# Patient Record
Sex: MALE | Race: WHITE | NOT HISPANIC OR LATINO | URBAN - METROPOLITAN AREA
[De-identification: names, ages, dates, MRNs, and addresses within clinical notes are randomized per-mention and may not be internally consistent; named-entity substitution may affect disease eponyms.]

---

## 2017-07-19 ENCOUNTER — INPATIENT (INPATIENT)
Facility: HOSPITAL | Age: 72
LOS: 1 days | Discharge: HOME | DRG: 190 | End: 2017-07-21
Attending: GENERAL ACUTE CARE HOSPITAL | Admitting: GENERAL ACUTE CARE HOSPITAL
Payer: MEDICARE

## 2017-07-19 VITALS
DIASTOLIC BLOOD PRESSURE: 65 MMHG | SYSTOLIC BLOOD PRESSURE: 105 MMHG | HEART RATE: 100 BPM | RESPIRATION RATE: 24 BRPM | OXYGEN SATURATION: 95 % | TEMPERATURE: 37 F

## 2017-07-19 DIAGNOSIS — R06.00 DYSPNEA, UNSPECIFIED: ICD-10-CM

## 2017-07-19 DIAGNOSIS — J18.9 PNEUMONIA, UNSPECIFIED ORGANISM: ICD-10-CM

## 2017-07-19 DIAGNOSIS — R07.9 CHEST PAIN, UNSPECIFIED: ICD-10-CM

## 2017-07-19 DIAGNOSIS — Z90.2 ACQUIRED ABSENCE OF LUNG [PART OF]: Chronic | ICD-10-CM

## 2017-07-19 LAB
ALBUMIN SERPL ELPH-MCNC: 4 G/DL — SIGNIFICANT CHANGE UP (ref 3.3–5)
ALP SERPL-CCNC: 64 U/L — SIGNIFICANT CHANGE UP (ref 40–120)
ALT FLD-CCNC: 21 U/L RC — SIGNIFICANT CHANGE UP (ref 10–45)
ANION GAP SERPL CALC-SCNC: 15 MMOL/L — SIGNIFICANT CHANGE UP (ref 5–17)
APPEARANCE UR: CLEAR — SIGNIFICANT CHANGE UP
APTT BLD: 28 SEC — SIGNIFICANT CHANGE UP (ref 27.5–37.4)
AST SERPL-CCNC: 18 U/L — SIGNIFICANT CHANGE UP (ref 10–40)
BACTERIA # UR AUTO: ABNORMAL /HPF
BASE EXCESS BLDV CALC-SCNC: 1.4 MMOL/L — SIGNIFICANT CHANGE UP (ref -2–2)
BASOPHILS # BLD AUTO: 0 K/UL — SIGNIFICANT CHANGE UP (ref 0–0.2)
BILIRUB SERPL-MCNC: 1.2 MG/DL — SIGNIFICANT CHANGE UP (ref 0.2–1.2)
BILIRUB UR-MCNC: NEGATIVE — SIGNIFICANT CHANGE UP
BUN SERPL-MCNC: 27 MG/DL — HIGH (ref 7–23)
CA-I SERPL-SCNC: 1.2 MMOL/L — SIGNIFICANT CHANGE UP (ref 1.12–1.3)
CALCIUM SERPL-MCNC: 9.3 MG/DL — SIGNIFICANT CHANGE UP (ref 8.4–10.5)
CHLORIDE BLDV-SCNC: 96 MMOL/L — SIGNIFICANT CHANGE UP (ref 96–108)
CHLORIDE SERPL-SCNC: 95 MMOL/L — LOW (ref 96–108)
CK MB BLD-MCNC: 2.8 % — SIGNIFICANT CHANGE UP (ref 0–3.5)
CK MB CFR SERPL CALC: 2.7 NG/ML — SIGNIFICANT CHANGE UP (ref 0–6.7)
CK MB CFR SERPL CALC: 2.8 NG/ML — SIGNIFICANT CHANGE UP (ref 0–6.7)
CK SERPL-CCNC: 98 U/L — SIGNIFICANT CHANGE UP (ref 30–200)
CO2 BLDV-SCNC: 28 MMOL/L — SIGNIFICANT CHANGE UP (ref 22–30)
CO2 SERPL-SCNC: 24 MMOL/L — SIGNIFICANT CHANGE UP (ref 22–31)
COLOR SPEC: SIGNIFICANT CHANGE UP
CREAT SERPL-MCNC: 1.34 MG/DL — HIGH (ref 0.5–1.3)
DIFF PNL FLD: NEGATIVE — SIGNIFICANT CHANGE UP
EOSINOPHIL # BLD AUTO: 0.1 K/UL — SIGNIFICANT CHANGE UP (ref 0–0.5)
GAS PNL BLDV: 133 MMOL/L — LOW (ref 136–145)
GAS PNL BLDV: SIGNIFICANT CHANGE UP
GLUCOSE BLDV-MCNC: 133 MG/DL — HIGH (ref 70–99)
GLUCOSE SERPL-MCNC: 129 MG/DL — HIGH (ref 70–99)
GLUCOSE UR QL: NEGATIVE — SIGNIFICANT CHANGE UP
HCO3 BLDV-SCNC: 26 MMOL/L — SIGNIFICANT CHANGE UP (ref 21–29)
HCT VFR BLD CALC: 40.8 % — SIGNIFICANT CHANGE UP (ref 39–50)
HCT VFR BLDA CALC: 44 % — SIGNIFICANT CHANGE UP (ref 39–50)
HGB BLD CALC-MCNC: 14.3 G/DL — SIGNIFICANT CHANGE UP (ref 13–17)
HGB BLD-MCNC: 13.5 G/DL — SIGNIFICANT CHANGE UP (ref 13–17)
INR BLD: 1.23 RATIO — HIGH (ref 0.88–1.16)
KETONES UR-MCNC: NEGATIVE — SIGNIFICANT CHANGE UP
LACTATE BLDV-MCNC: 1.6 MMOL/L — SIGNIFICANT CHANGE UP (ref 0.7–2)
LEUKOCYTE ESTERASE UR-ACNC: NEGATIVE — SIGNIFICANT CHANGE UP
LYMPHOCYTES # BLD AUTO: 1.3 K/UL — SIGNIFICANT CHANGE UP (ref 1–3.3)
LYMPHOCYTES # BLD AUTO: 3 % — LOW (ref 13–44)
MCHC RBC-ENTMCNC: 29.7 PG — SIGNIFICANT CHANGE UP (ref 27–34)
MCHC RBC-ENTMCNC: 33 GM/DL — SIGNIFICANT CHANGE UP (ref 32–36)
MCV RBC AUTO: 90.1 FL — SIGNIFICANT CHANGE UP (ref 80–100)
METAMYELOCYTES # FLD: 1 % — HIGH (ref 0–0)
MONOCYTES # BLD AUTO: 1.6 K/UL — HIGH (ref 0–0.9)
MONOCYTES NFR BLD AUTO: 5 % — SIGNIFICANT CHANGE UP (ref 2–14)
NEUTROPHILS # BLD AUTO: 24.2 K/UL — HIGH (ref 1.8–7.4)
NEUTROPHILS NFR BLD AUTO: 82 % — HIGH (ref 43–77)
NEUTS BAND # BLD: 9 % — HIGH (ref 0–8)
NITRITE UR-MCNC: NEGATIVE — SIGNIFICANT CHANGE UP
NT-PROBNP SERPL-SCNC: 768 PG/ML — HIGH (ref 0–300)
OTHER CELLS CSF MANUAL: 8 ML/DL — LOW (ref 18–22)
PCO2 BLDV: 45 MMHG — SIGNIFICANT CHANGE UP (ref 35–50)
PH BLDV: 7.38 — SIGNIFICANT CHANGE UP (ref 7.35–7.45)
PH UR: 6 — SIGNIFICANT CHANGE UP (ref 5–8)
PLAT MORPH BLD: NORMAL — SIGNIFICANT CHANGE UP
PLATELET # BLD AUTO: 291 K/UL — SIGNIFICANT CHANGE UP (ref 150–400)
PO2 BLDV: 28 MMHG — SIGNIFICANT CHANGE UP (ref 25–45)
POTASSIUM BLDV-SCNC: 4.1 MMOL/L — SIGNIFICANT CHANGE UP (ref 3.5–5)
POTASSIUM SERPL-MCNC: 4.4 MMOL/L — SIGNIFICANT CHANGE UP (ref 3.5–5.3)
POTASSIUM SERPL-SCNC: 4.4 MMOL/L — SIGNIFICANT CHANGE UP (ref 3.5–5.3)
PROT SERPL-MCNC: 7.3 G/DL — SIGNIFICANT CHANGE UP (ref 6–8.3)
PROT UR-MCNC: NEGATIVE — SIGNIFICANT CHANGE UP
PROTHROM AB SERPL-ACNC: 13.3 SEC — HIGH (ref 9.8–12.7)
RBC # BLD: 4.54 M/UL — SIGNIFICANT CHANGE UP (ref 4.2–5.8)
RBC # FLD: 13.5 % — SIGNIFICANT CHANGE UP (ref 10.3–14.5)
RBC BLD AUTO: SIGNIFICANT CHANGE UP
RBC CASTS # UR COMP ASSIST: SIGNIFICANT CHANGE UP /HPF (ref 0–2)
SAO2 % BLDV: 39 % — LOW (ref 67–88)
SODIUM SERPL-SCNC: 134 MMOL/L — LOW (ref 135–145)
SP GR SPEC: 1.01 — LOW (ref 1.01–1.02)
TROPONIN T SERPL-MCNC: <0.01 NG/ML — SIGNIFICANT CHANGE UP (ref 0–0.06)
TROPONIN T SERPL-MCNC: <0.01 NG/ML — SIGNIFICANT CHANGE UP (ref 0–0.06)
UROBILINOGEN FLD QL: NEGATIVE — SIGNIFICANT CHANGE UP
WBC # BLD: 27.2 K/UL — HIGH (ref 3.8–10.5)
WBC # FLD AUTO: 27.2 K/UL — HIGH (ref 3.8–10.5)
WBC UR QL: SIGNIFICANT CHANGE UP /HPF (ref 0–5)

## 2017-07-19 PROCEDURE — 93010 ELECTROCARDIOGRAM REPORT: CPT

## 2017-07-19 PROCEDURE — 71275 CT ANGIOGRAPHY CHEST: CPT | Mod: 26

## 2017-07-19 PROCEDURE — 71020: CPT | Mod: 26

## 2017-07-19 PROCEDURE — 99284 EMERGENCY DEPT VISIT MOD MDM: CPT | Mod: 25

## 2017-07-19 RX ORDER — LORATADINE 10 MG/1
1 TABLET ORAL
Qty: 0 | Refills: 0 | COMMUNITY

## 2017-07-19 RX ORDER — BUDESONIDE AND FORMOTEROL FUMARATE DIHYDRATE 160; 4.5 UG/1; UG/1
2 AEROSOL RESPIRATORY (INHALATION)
Qty: 0 | Refills: 0 | Status: DISCONTINUED | OUTPATIENT
Start: 2017-07-19 | End: 2017-07-20

## 2017-07-19 RX ORDER — TADALAFIL 10 MG/1
1 TABLET, FILM COATED ORAL
Qty: 0 | Refills: 0 | COMMUNITY

## 2017-07-19 RX ORDER — IPRATROPIUM/ALBUTEROL SULFATE 18-103MCG
3 AEROSOL WITH ADAPTER (GRAM) INHALATION
Qty: 0 | Refills: 0 | Status: COMPLETED | OUTPATIENT
Start: 2017-07-19 | End: 2017-07-19

## 2017-07-19 RX ORDER — AMLODIPINE BESYLATE 2.5 MG/1
1 TABLET ORAL
Qty: 0 | Refills: 0 | COMMUNITY

## 2017-07-19 RX ORDER — ENOXAPARIN SODIUM 100 MG/ML
40 INJECTION SUBCUTANEOUS ONCE
Qty: 0 | Refills: 0 | Status: COMPLETED | OUTPATIENT
Start: 2017-07-19 | End: 2017-07-19

## 2017-07-19 RX ORDER — TIOTROPIUM BROMIDE 18 UG/1
1 CAPSULE ORAL; RESPIRATORY (INHALATION)
Qty: 0 | Refills: 0 | COMMUNITY

## 2017-07-19 RX ORDER — FAMOTIDINE 10 MG/ML
20 INJECTION INTRAVENOUS DAILY
Qty: 0 | Refills: 0 | Status: DISCONTINUED | OUTPATIENT
Start: 2017-07-19 | End: 2017-07-21

## 2017-07-19 RX ORDER — ENOXAPARIN SODIUM 100 MG/ML
40 INJECTION SUBCUTANEOUS DAILY
Qty: 0 | Refills: 0 | Status: DISCONTINUED | OUTPATIENT
Start: 2017-07-19 | End: 2017-07-19

## 2017-07-19 RX ORDER — SODIUM CHLORIDE 9 MG/ML
1000 INJECTION INTRAMUSCULAR; INTRAVENOUS; SUBCUTANEOUS ONCE
Qty: 0 | Refills: 0 | Status: COMPLETED | OUTPATIENT
Start: 2017-07-19 | End: 2017-07-19

## 2017-07-19 RX ORDER — FLUTICASONE PROPIONATE AND SALMETEROL 50; 250 UG/1; UG/1
1 POWDER ORAL; RESPIRATORY (INHALATION)
Qty: 0 | Refills: 0 | COMMUNITY

## 2017-07-19 RX ORDER — ALBUTEROL 90 UG/1
3 AEROSOL, METERED ORAL
Qty: 0 | Refills: 0 | COMMUNITY

## 2017-07-19 RX ORDER — FLUTICASONE PROPIONATE 50 MCG
1 SPRAY, SUSPENSION NASAL
Qty: 0 | Refills: 0 | Status: DISCONTINUED | OUTPATIENT
Start: 2017-07-19 | End: 2017-07-21

## 2017-07-19 RX ORDER — NEBIVOLOL HYDROCHLORIDE 5 MG/1
5 TABLET ORAL DAILY
Qty: 0 | Refills: 0 | Status: DISCONTINUED | OUTPATIENT
Start: 2017-07-19 | End: 2017-07-21

## 2017-07-19 RX ORDER — LORATADINE 10 MG/1
10 TABLET ORAL DAILY
Qty: 0 | Refills: 0 | Status: DISCONTINUED | OUTPATIENT
Start: 2017-07-19 | End: 2017-07-21

## 2017-07-19 RX ORDER — TIOTROPIUM BROMIDE 18 UG/1
1 CAPSULE ORAL; RESPIRATORY (INHALATION) DAILY
Qty: 0 | Refills: 0 | Status: DISCONTINUED | OUTPATIENT
Start: 2017-07-19 | End: 2017-07-20

## 2017-07-19 RX ORDER — AMLODIPINE BESYLATE 2.5 MG/1
5 TABLET ORAL DAILY
Qty: 0 | Refills: 0 | Status: DISCONTINUED | OUTPATIENT
Start: 2017-07-19 | End: 2017-07-21

## 2017-07-19 RX ADMIN — SODIUM CHLORIDE 1000 MILLILITER(S): 9 INJECTION INTRAMUSCULAR; INTRAVENOUS; SUBCUTANEOUS at 17:41

## 2017-07-19 RX ADMIN — NEBIVOLOL HYDROCHLORIDE 5 MILLIGRAM(S): 5 TABLET ORAL at 23:13

## 2017-07-19 RX ADMIN — ENOXAPARIN SODIUM 40 MILLIGRAM(S): 100 INJECTION SUBCUTANEOUS at 18:23

## 2017-07-19 RX ADMIN — Medication 3 MILLILITER(S): at 12:47

## 2017-07-19 RX ADMIN — Medication 1 SPRAY(S): at 20:21

## 2017-07-19 RX ADMIN — Medication 1200 MILLIGRAM(S): at 20:22

## 2017-07-19 RX ADMIN — Medication 3 MILLILITER(S): at 12:48

## 2017-07-19 RX ADMIN — Medication 3 MILLILITER(S): at 12:50

## 2017-07-19 RX ADMIN — Medication 125 MILLIGRAM(S): at 12:48

## 2017-07-19 NOTE — CONSULT NOTE ADULT - SUBJECTIVE AND OBJECTIVE BOX
[facility] - [facility]  CONSULT NOTE    HPI: 72 year old gentleman, former smoker, retired , followed by Dr.David Burton for COPD/emphysema and squamous cell lung cancer s/p lingular segmentectomy in  complicated by severe total body subcutaneous emphysema. He was treated with levaquin in May after a CT scan at [facility] was abnormal although the patient denies having acute symptoms. He was seen last week in our office for several weeks of worsening shortness of breath especially when walking outside in the hot weather and not relieved by his rescue bronchodilator. He had more prominent cough productive of white sputum as well as chest tightness. The patient was treated with augmentin, mucinex, albuterol nebs, prednisone taper in addition to his usual advair, spiriva and pro-air HFA as needed. After initial improvement, the patient has again developed severe shortness of breath with a mild cough. He awoke last night with a severe sense of anxiety and doom associated with chest tightness and palpitations. No fevers, chills or sweats. No chest congestion or wheeze.     PMHX:  Lung cancer (squamous cell carcinoma)  Emphysema lung  COPD (chronic obstructive pulmonary disease)  Herpetic ocular infection  GERD  Nephrolithiasis  Pneumonia  HTN  Prostate cancer s/p RT   Seasonal allergic rhinitis      PSHX:  H/O lingular segmentectomy  cholecystecomy  colonoscopy      FAMILY HISTORY: cancer in mother/father/sister/brother    SOCIAL HISTORY: former smoker 1ppd x 54 years d/c 2010 - retired  -     Pulmonary Medications:       Antimicrobials:  levoFLOXacin IVPB 750 milliGRAM(s) IV Intermittent once      Cardiology:      Other:      Allergies    No Known Allergies      HOME MEDICATIONS: see  H & P    REVIEW OF SYSTEMS:  Constitutional: As per HPI  HEENT: Within normal limits  CV: As per HPI  Resp: As per HPI  GI: Within normal limits   : Within normal limits  Musculoskeletal: Within normal limits  Skin: Within normal limits  Neurological: Within normal limits  Psychiatric: As per HPI  Endocrine: Within normal limits  Hematologic/Lymphatic: Within normal limits  Allergic/Immunologic: Within normal limits    [ ] All other systems negative                                                                                                                                                                              [ ]Unable to obtain    OBJECTIVE:      I&O's Detail      PHYSICAL EXAM:  ICU Vital Signs Last 24 Hrs  T(C): 2.6 (2017 12:10), Max: 2.6 (2017 12:10)  T(F): 36.7 (2017 12:10), Max: 36.7 (2017 12:10)  HR: 95 (2017 12:14) (95 - 100)  BP: 101/57 (2017 12:14) (101/57 - 105/65)  BP(mean): --  ABP: --  ABP(mean): --  RR: 20 (2017 12:14) (20 - 24)  SpO2: 95% (2017 12:14) (95% - 95%)    General: Awake. Alert. Cooperative. No distress. Appears stated age 	  HEENT:   Atraumatic. Normocephalic. Anicteric. Normal oral mucosa, PERRL, EOMI  Neck: Supple. Trachea midline. Thyroid without enlargement/tenderness/nodules. No carotid bruit. No JVD	  Cardiovascular: Regular rate and rhythm. S1 S2 normal. No murmurs, rubs or gallops  Respiratory: Respirations unlabored. Decreased breath sounds throughout. Minimal rhonchi.  Abdomen: Soft. Non-tender. Non-distended. No organomegaly. No masses. Normal bowel sounds	  Extremities: Warm to touch. No clubbing or cyanosis. No pedal edema.  Pulses: 2+ peripheral pulses all extremities	  Skin: Normal skin color. No rashes or lesions. No ecchymoses, No cyanosis. Warm to touch  Lymph Nodes: Cervical, supraclavicular and axillary nodes normal  Neurological: Motor and sensory examination equal and normal. A and O x 3  Psychiatry: Appropriate mood and affect.      LABS:                          13.5   27.2  )-----------( 291      ( 2017 12:54 )             40.8     07-19    134<L>  |  95<L>  |  27<H>  ----------------------------<  129<H>  4.4   |  24  |  1.34<H>    Ca      9.3            TPro  7.3  /  Alb  4.0  /  TBili  1.2  /  DBili  x   /  AST  18  /  ALT  21  /  AlkPhos  64  07-19    PT/INR - ( 2017 12:54 )   PT: 13.3 sec;   INR: 1.23 ratio         PTT - ( 2017 12:54 )  PTT:28.0 sec    Venous Blood Gas:   @ 12:54  7.38/45/28/26/39  VBG Lactate: 1.6        Serum Pro-Brain Natriuretic Peptide: 768 pg/mL ( @ 12:54)      CARDIAC MARKERS ( 2017 12:54 )  x     / <0.01 ng/mL / 98 U/L / x     / 2.7 ng/mL          MICROBIOLOGY:   Urinalysis Basic - ( 2017 15:02 )    Color: x / Appearance: Clear / S.007 / pH: x  Gluc: x / Ketone: Negative  / Bili: Negative / Urobili: Negative   Blood: x / Protein: Negative / Nitrite: Negative   Leuk Esterase: Negative / RBC: 0-2 /HPF / WBC 0-2 /HPF   Sq Epi: x / Non Sq Epi: x / Bacteria: Few /HPF        RADIOLOGY:  [x ] Chest radiographs reviewed and interpreted by me    < from: Xray Chest 2 Views PA/Lat (17 @ 13:50) >    EXAM:  CHEST PA & LAT                            PROCEDURE DATE:  2017        INTERPRETATION:  CLINICAL INFORMATION: Chest pain and cough    EXAM: AP view of the chest  2017    COMPARISON: No radiographs are available for comparison at this time.    FINDINGS:    The heart is normal in size  There are no focal lung consolidations.  There is no pleural effusion.  There is no pneumothorax.    IMPRESSION:    No focal lung consolidation      SARITA MATHEWS M.D., RADIOLOGY RESIDENT  This document has been electronically signed.  HADLEY CARNEY M.D., ATTENDING RADIOLOGIST  This document has been electronically signed. 2017  2:30PM      < end of copied text >  ---------------------------------------------------------------------------------------------------------

## 2017-07-19 NOTE — CONSULT NOTE ADULT - ASSESSMENT
sob/chest tightness  likely due to copd exacerbation cant rule out PNA  fu with ct and pulm  agree with anbx, nebs, steroids  JOSE with 2 sets of ce.  check echo  ischemic work up once stable , can be done as outpt  HTN  stable  cont current meds  copd  plan as above

## 2017-07-19 NOTE — ED PROVIDER NOTE - MEDICAL DECISION MAKING DETAILS
attending Almaz: 72yM with COPD, prior lung CA s/p resection, presents with worsening dyspnea x several days but with significantly worsening symptoms since last night after episode of sudden onset SOB and palpitations. No improvement on Amoxicillin and Prednisone as outpatient. He denies cough, or fever. +chills. On examination, O2 sat 92-92%, mildly tachypneic, poor air movement diffusely on lung exam. Most likely COPD exacerbation but with episode of sudden onset SOB and palpitations also concerned for PE. Will administer duonebs, steroids, abx, obtain labwork, ekg, cardiac monitoring cxr, CTA r/o PE and admission.

## 2017-07-19 NOTE — H&P ADULT - PROBLEM SELECTOR PLAN 1
sec to copd given risk factors  as well as giiven elevated WBC ( likely sec to sterodis ) , cough and chills  CTA ordered to r/o PE and PNA .  CT done : f/u given risk factors  as well as giiven elevated WBC ( likely sec to sterodis ) , cough and chills  CTA ordered to r/o PE and PNA .   cont prednsione at increased dose for copd exacerbaion . levaquin   CT done : f/u

## 2017-07-19 NOTE — CONSULT NOTE ADULT - SUBJECTIVE AND OBJECTIVE BOX
CHIEF COMPLAINT: sob/cp    HISTORY OF PRESENT ILLNESS:    this is a pleasant 72 year old male w COPD, Lung Cancer s/p resection in 2010 presents w dyspnea, hypotension, chest tightness  pos cough, fever, chills no dizziness or syncope chest tightness not exertional 	      PAST MEDICAL & SURGICAL HISTORY:  Lung cancer  Emphysema lung  COPD (chronic obstructive pulmonary disease)  H/O pneumonectomy          MEDICATIONS:  nebivolol 5 milliGRAM(s) Oral daily  amLODIPine   Tablet 5 milliGRAM(s) Oral daily    levoFLOXacin IVPB 500 milliGRAM(s) IV Intermittent every 24 hours    buDESOnide 160 MICROgram(s)/formoterol 4.5 MICROgram(s) Inhaler 2 Puff(s) Inhalation two times a day  tiotropium 18 MICROgram(s) Capsule 1 Capsule(s) Inhalation daily  loratadine 10 milliGRAM(s) Oral daily  guaiFENesin ER 1200 milliGRAM(s) Oral every 12 hours      famotidine    Tablet 20 milliGRAM(s) Oral daily    predniSONE   Tablet 50 milliGRAM(s) Oral daily    fluticasone propionate 50 MICROgram(s)/spray Nasal Spray 1 Spray(s) Both Nostrils two times a day      FAMILY HISTORY:      Non-contributory    SOCIAL HISTORY:    No tobacco, drugs or etoh    Allergies    No Known Allergies    Intolerances    	    REVIEW OF SYSTEMS:  as above  The rest of the 14 points ROS reviewed and except above they are unremarkable.        PHYSICAL EXAM:  T(C): 36.7 (07-19-17 @ 16:08), Max: 36.7 (07-19-17 @ 16:08)  HR: 86 (07-19-17 @ 16:08) (86 - 100)  BP: 115/72 (07-19-17 @ 16:08) (101/57 - 115/72)  RR: 16 (07-19-17 @ 16:08) (16 - 24)  SpO2: 93% (07-19-17 @ 16:08) (93% - 95%)  Wt(kg): --  I&O's Summary      Appearance: Normal	  HEENT:   Normal oral mucosa, PERRL, EOMI	  Cardiovascular: Normal S1 S2,    Murmur:   Neck: JVP normal  Respiratory: few crackles   Gastrointestinal:  Soft, Non-tender, + BS	  Skin: normal   Neuro: No gross deficits.   Psychiatry:  Mood & affect appropriate  Ext: No edema    TELEMETRY: 	  pvcs  ECG:  	sinus , rbbb  RADIOLOGY:  OTHER: 	  	  LABS:	 	    CARDIAC MARKERS:  Troponin T, Serum: <0.01 ng/mL (07-19 @ 12:54)                                  13.5   27.2  )-----------( 291      ( 19 Jul 2017 12:54 )             40.8     07-19    134<L>  |  95<L>  |  27<H>  ----------------------------<  129<H>  4.4   |  24  |  1.34<H>    Ca    9.3      19 Jul 2017 12:54    TPro  7.3  /  Alb  4.0  /  TBili  1.2  /  DBili  x   /  AST  18  /  ALT  21  /  AlkPhos  64  07-19    proBNP: Serum Pro-Brain Natriuretic Peptide: 768 pg/mL (07-19 @ 12:54)    Lipid Profile:   HgA1c:   TSH:

## 2017-07-19 NOTE — H&P ADULT - ASSESSMENT
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 now a /w CP/SOB . 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 on abx and prednsione for one week old COPD exacerbation / cough now a /w CP/SOB on day

## 2017-07-19 NOTE — ED ADULT NURSE NOTE - OBJECTIVE STATEMENT
Pt with hs of copd emphysema and lung ca with pneumonectomy in 2010 presents c/o chest tightness and sob. Pt reports being sent by md after taking Amoxicillin prednisone and albuterol neb for a persistent cough and sob for one week. Pt reports no relief from medications and was also sent for low bp and irregular heartrate. Pt A&Ox3 denies chest pain, +sob no nvd, no fever chills headache or dizziness +cough no le swelling or jvd.

## 2017-07-19 NOTE — CONSULT NOTE ADULT - ASSESSMENT
ASSESSMENT    h/o COPD/emphysema with minimal bronchospasm  h/o lung cancer s/p lingular segmentectomy - CT scan at [facility] in May with an abnormality treated with levaquin - follow-u CT had been scheduled for August  dyspnea which seems out of proportion to bronchospasm - would r/o pulmonary embolism - elevated WBC may be due to steroids but need to r/o occult pneumonia    PLAN/RECOMMENDATIONS    Stable oxygenation on room air  levaquin   prednisone 50 mg daily  symbicort/spiriva  mucinex  claritin/flonase  pepcid  DVT prophylaxis  CTA chest    Thank you for the courtesy of this referral    Crow Varghese MD, [facility] - [phone]  Pulmonary Medicine

## 2017-07-19 NOTE — H&P ADULT - HISTORY OF PRESENT ILLNESS
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 now a /w CP/SOB . pt was seen and examined on 7/19   case discusssed with DR. Varghese   plan discussed with pt and wife at bedside   72 year old gentleman, former smoker, retired , followed by Dr. Crow Burton for COPD/emphysema and squamous cell lung cancer s/p lingular segmentectomy in 2010 a /w CP/SOB .  Pt was seen as o/p one week ago for cough and sob , was given prednsione , nebs and augmentin.. he had some improvement overall however overnight pt was awaken by an episode of sob and chest discomfort .   CP  6/10 no radiaition  , no palplitation   , brief and non exertional and " improved   cough with no fever but had chills at home   no N/V/D

## 2017-07-20 ENCOUNTER — TRANSCRIPTION ENCOUNTER (OUTPATIENT)
Age: 72
End: 2017-07-20

## 2017-07-20 DIAGNOSIS — I10 ESSENTIAL (PRIMARY) HYPERTENSION: ICD-10-CM

## 2017-07-20 DIAGNOSIS — C61 MALIGNANT NEOPLASM OF PROSTATE: ICD-10-CM

## 2017-07-20 DIAGNOSIS — C34.90 MALIGNANT NEOPLASM OF UNSPECIFIED PART OF UNSPECIFIED BRONCHUS OR LUNG: ICD-10-CM

## 2017-07-20 LAB
ALBUMIN SERPL ELPH-MCNC: 3.1 G/DL — LOW (ref 3.3–5)
ALP SERPL-CCNC: 62 U/L — SIGNIFICANT CHANGE UP (ref 40–120)
ALT FLD-CCNC: 17 U/L — SIGNIFICANT CHANGE UP (ref 10–45)
ANION GAP SERPL CALC-SCNC: 14 MMOL/L — SIGNIFICANT CHANGE UP (ref 5–17)
AST SERPL-CCNC: 15 U/L — SIGNIFICANT CHANGE UP (ref 10–40)
BASOPHILS # BLD AUTO: 0 K/UL — SIGNIFICANT CHANGE UP (ref 0–0.2)
BASOPHILS NFR BLD AUTO: 0 % — SIGNIFICANT CHANGE UP (ref 0–2)
BILIRUB SERPL-MCNC: 0.7 MG/DL — SIGNIFICANT CHANGE UP (ref 0.2–1.2)
BUN SERPL-MCNC: 27 MG/DL — HIGH (ref 7–23)
CALCIUM SERPL-MCNC: 8.9 MG/DL — SIGNIFICANT CHANGE UP (ref 8.4–10.5)
CHLORIDE SERPL-SCNC: 100 MMOL/L — SIGNIFICANT CHANGE UP (ref 96–108)
CK MB CFR SERPL CALC: 2.7 NG/ML — SIGNIFICANT CHANGE UP (ref 0–6.7)
CO2 SERPL-SCNC: 23 MMOL/L — SIGNIFICANT CHANGE UP (ref 22–31)
CREAT SERPL-MCNC: 1.03 MG/DL — SIGNIFICANT CHANGE UP (ref 0.5–1.3)
CULTURE RESULTS: NO GROWTH — SIGNIFICANT CHANGE UP
EOSINOPHIL # BLD AUTO: 0 K/UL — SIGNIFICANT CHANGE UP (ref 0–0.5)
EOSINOPHIL NFR BLD AUTO: 0 % — SIGNIFICANT CHANGE UP (ref 0–6)
GLUCOSE SERPL-MCNC: 134 MG/DL — HIGH (ref 70–99)
HCT VFR BLD CALC: 35.8 % — LOW (ref 39–50)
HGB BLD-MCNC: 12.2 G/DL — LOW (ref 13–17)
IMM GRANULOCYTES NFR BLD AUTO: 0.4 % — SIGNIFICANT CHANGE UP (ref 0–1.5)
LYMPHOCYTES # BLD AUTO: 0.95 K/UL — LOW (ref 1–3.3)
LYMPHOCYTES # BLD AUTO: 4.6 % — LOW (ref 13–44)
MCHC RBC-ENTMCNC: 29.5 PG — SIGNIFICANT CHANGE UP (ref 27–34)
MCHC RBC-ENTMCNC: 34.1 GM/DL — SIGNIFICANT CHANGE UP (ref 32–36)
MCV RBC AUTO: 86.5 FL — SIGNIFICANT CHANGE UP (ref 80–100)
MONOCYTES # BLD AUTO: 0.48 K/UL — SIGNIFICANT CHANGE UP (ref 0–0.9)
MONOCYTES NFR BLD AUTO: 2.3 % — SIGNIFICANT CHANGE UP (ref 2–14)
NEUTROPHILS # BLD AUTO: 18.92 K/UL — HIGH (ref 1.8–7.4)
NEUTROPHILS NFR BLD AUTO: 92.7 % — HIGH (ref 43–77)
PHOSPHATE SERPL-MCNC: 3 MG/DL — SIGNIFICANT CHANGE UP (ref 2.5–4.5)
PLATELET # BLD AUTO: 234 K/UL — SIGNIFICANT CHANGE UP (ref 150–400)
POTASSIUM SERPL-MCNC: 5 MMOL/L — SIGNIFICANT CHANGE UP (ref 3.5–5.3)
POTASSIUM SERPL-SCNC: 5 MMOL/L — SIGNIFICANT CHANGE UP (ref 3.5–5.3)
PROT SERPL-MCNC: 6.6 G/DL — SIGNIFICANT CHANGE UP (ref 6–8.3)
RBC # BLD: 4.14 M/UL — LOW (ref 4.2–5.8)
RBC # FLD: 14.8 % — HIGH (ref 10.3–14.5)
SODIUM SERPL-SCNC: 137 MMOL/L — SIGNIFICANT CHANGE UP (ref 135–145)
SPECIMEN SOURCE: SIGNIFICANT CHANGE UP
TROPONIN T SERPL-MCNC: <0.01 NG/ML — SIGNIFICANT CHANGE UP (ref 0–0.06)
WBC # BLD: 20.44 K/UL — HIGH (ref 3.8–10.5)
WBC # FLD AUTO: 20.44 K/UL — HIGH (ref 3.8–10.5)

## 2017-07-20 PROCEDURE — 93306 TTE W/DOPPLER COMPLETE: CPT | Mod: 26

## 2017-07-20 RX ORDER — BUDESONIDE, MICRONIZED 100 %
0.5 POWDER (GRAM) MISCELLANEOUS EVERY 12 HOURS
Qty: 0 | Refills: 0 | Status: DISCONTINUED | OUTPATIENT
Start: 2017-07-20 | End: 2017-07-21

## 2017-07-20 RX ORDER — IPRATROPIUM/ALBUTEROL SULFATE 18-103MCG
3 AEROSOL WITH ADAPTER (GRAM) INHALATION
Qty: 0 | Refills: 0 | Status: DISCONTINUED | OUTPATIENT
Start: 2017-07-20 | End: 2017-07-21

## 2017-07-20 RX ORDER — TAMSULOSIN HYDROCHLORIDE 0.4 MG/1
0.4 CAPSULE ORAL AT BEDTIME
Qty: 0 | Refills: 0 | Status: DISCONTINUED | OUTPATIENT
Start: 2017-07-20 | End: 2017-07-21

## 2017-07-20 RX ADMIN — Medication 1 SPRAY(S): at 06:32

## 2017-07-20 RX ADMIN — Medication 1200 MILLIGRAM(S): at 17:24

## 2017-07-20 RX ADMIN — Medication 3 MILLILITER(S): at 14:49

## 2017-07-20 RX ADMIN — Medication 3 MILLILITER(S): at 11:12

## 2017-07-20 RX ADMIN — LORATADINE 10 MILLIGRAM(S): 10 TABLET ORAL at 11:12

## 2017-07-20 RX ADMIN — FAMOTIDINE 20 MILLIGRAM(S): 10 INJECTION INTRAVENOUS at 11:12

## 2017-07-20 RX ADMIN — Medication 1 SPRAY(S): at 17:24

## 2017-07-20 RX ADMIN — Medication 3 MILLILITER(S): at 21:23

## 2017-07-20 RX ADMIN — NEBIVOLOL HYDROCHLORIDE 5 MILLIGRAM(S): 5 TABLET ORAL at 21:23

## 2017-07-20 RX ADMIN — Medication 0.5 MILLIGRAM(S): at 17:24

## 2017-07-20 RX ADMIN — TAMSULOSIN HYDROCHLORIDE 0.4 MILLIGRAM(S): 0.4 CAPSULE ORAL at 00:24

## 2017-07-20 RX ADMIN — TAMSULOSIN HYDROCHLORIDE 0.4 MILLIGRAM(S): 0.4 CAPSULE ORAL at 21:23

## 2017-07-20 RX ADMIN — Medication 3 MILLILITER(S): at 17:24

## 2017-07-20 RX ADMIN — Medication 1200 MILLIGRAM(S): at 06:33

## 2017-07-20 RX ADMIN — Medication 50 MILLIGRAM(S): at 06:34

## 2017-07-20 RX ADMIN — AMLODIPINE BESYLATE 5 MILLIGRAM(S): 2.5 TABLET ORAL at 06:36

## 2017-07-20 NOTE — PROGRESS NOTE ADULT - PROBLEM SELECTOR PLAN 1
given risk factors  as well as given elevated WBC ( likely sec to steroids ) , CTA done r/o PNA   r/o PE :both not seen on CT     cont prednsione at increased dose for copd exacerbaion . levaquin   nebs and check O2 sat on RA at rest and on exertion

## 2017-07-20 NOTE — DISCHARGE NOTE ADULT - CARE PROVIDER_API CALL
Crow Burton), Internal Medicine; Pulmonary Disease  [address] [address]  Phone: [phone]  Fax: [phone]    Wander Vasquez), Cardiovascular Disease  [address] [place] [id number]  Phone: [phone]  Fax: [phone]

## 2017-07-20 NOTE — DISCHARGE NOTE ADULT - PLAN OF CARE
Pneumonia is a lung infection that can cause a fever, cough, and trouble breathing.  Continue all antibiotics as ordered until complete.  Nutrition is important, eat small frequent meals.  Get lots of rest and drink fluids.  Call your health care provider upon arrival home from hospital and make a follow up appointment for one week.  If your cough worsens, you develop fever greater than 101', you have shaking chills, a fast heartbeat, trouble breathing and/or feel your are breathing much faster than usual, call your healthcare provider.  Make sure you wash your hands frequently. Call your Health Care provider upon arrival home to make a follow up appointment within one week.  Take all inhalers as prescribed by your Health Care Provider.  Take steroids as prescribed by your Health Care Provider.  If your cough increases infrequency and severity and/or you have shortness of breath or increased shortness of breath call your Health Care Provider.  If you develop fever, chills, night sweats, malaise, and/or change in mental status call your Health care Provider.  Nutrition is very important.  Eat small frequent meals.  Increase your activity as tolerated.  Do not stay in bed all day Take medications for your blood pressure as recommended.  Eat a heart healthy diet that is low in saturated fats and salt, and includes whole grains, fruits, vegetables and lean protein   Exercise regularly (consult with your physician or cardiologist first); maintain a heart healthy weight.   If you smoke - quit (A resource to help you stop smoking is the [facility] – phone number 888-031-8763.). Continue to follow with your primary physician or cardiologist.   Seek medical help for dizziness, Lightheadedness, Blurry vision, Headache, Chest pain, Shortness of breath  Follow up with your medical doctor to establish long term blood pressure treatment goals. Follow with your oncologist as outpatient. Continue with antibiotic treatment Follow up with your medical doctor to establish long term blood pressure treatment goals. Symptom management

## 2017-07-20 NOTE — DISCHARGE NOTE ADULT - CARE PLAN
Principal Discharge DX:	Pneumonia  Instructions for follow-up, activity and diet:	Pneumonia is a lung infection that can cause a fever, cough, and trouble breathing.  Continue all antibiotics as ordered until complete.  Nutrition is important, eat small frequent meals.  Get lots of rest and drink fluids.  Call your health care provider upon arrival home from hospital and make a follow up appointment for one week.  If your cough worsens, you develop fever greater than 101', you have shaking chills, a fast heartbeat, trouble breathing and/or feel your are breathing much faster than usual, call your healthcare provider.  Make sure you wash your hands frequently.  Secondary Diagnosis:	COPD (chronic obstructive pulmonary disease)  Instructions for follow-up, activity and diet:	Call your Health Care provider upon arrival home to make a follow up appointment within one week.  Take all inhalers as prescribed by your Health Care Provider.  Take steroids as prescribed by your Health Care Provider.  If your cough increases infrequency and severity and/or you have shortness of breath or increased shortness of breath call your Health Care Provider.  If you develop fever, chills, night sweats, malaise, and/or change in mental status call your Health care Provider.  Nutrition is very important.  Eat small frequent meals.  Increase your activity as tolerated.  Do not stay in bed all day  Secondary Diagnosis:	Hypertension  Instructions for follow-up, activity and diet:	Take medications for your blood pressure as recommended.  Eat a heart healthy diet that is low in saturated fats and salt, and includes whole grains, fruits, vegetables and lean protein   Exercise regularly (consult with your physician or cardiologist first); maintain a heart healthy weight.   If you smoke - quit (A resource to help you stop smoking is the [facility] – phone number [phone].). Continue to follow with your primary physician or cardiologist.   Seek medical help for dizziness, Lightheadedness, Blurry vision, Headache, Chest pain, Shortness of breath  Follow up with your medical doctor to establish long term blood pressure treatment goals.  Secondary Diagnosis:	Lung cancer  Instructions for follow-up, activity and diet:	Follow with your oncologist as outpatient. Principal Discharge DX:	Pneumonia  Goal:	Continue with antibiotic treatment  Instructions for follow-up, activity and diet:	Pneumonia is a lung infection that can cause a fever, cough, and trouble breathing.  Continue all antibiotics as ordered until complete.  Nutrition is important, eat small frequent meals.  Get lots of rest and drink fluids.  Call your health care provider upon arrival home from hospital and make a follow up appointment for one week.  If your cough worsens, you develop fever greater than 101', you have shaking chills, a fast heartbeat, trouble breathing and/or feel your are breathing much faster than usual, call your healthcare provider.  Make sure you wash your hands frequently.  Secondary Diagnosis:	COPD (chronic obstructive pulmonary disease)  Instructions for follow-up, activity and diet:	Call your Health Care provider upon arrival home to make a follow up appointment within one week.  Take all inhalers as prescribed by your Health Care Provider.  Take steroids as prescribed by your Health Care Provider.  If your cough increases infrequency and severity and/or you have shortness of breath or increased shortness of breath call your Health Care Provider.  If you develop fever, chills, night sweats, malaise, and/or change in mental status call your Health care Provider.  Nutrition is very important.  Eat small frequent meals.  Increase your activity as tolerated.  Do not stay in bed all day  Secondary Diagnosis:	Hypertension  Instructions for follow-up, activity and diet:	Take medications for your blood pressure as recommended.  Eat a heart healthy diet that is low in saturated fats and salt, and includes whole grains, fruits, vegetables and lean protein   Exercise regularly (consult with your physician or cardiologist first); maintain a heart healthy weight.   If you smoke - quit (A resource to help you stop smoking is the [facility] – phone number [phone].). Continue to follow with your primary physician or cardiologist.   Seek medical help for dizziness, Lightheadedness, Blurry vision, Headache, Chest pain, Shortness of breath  Follow up with your medical doctor to establish long term blood pressure treatment goals.  Secondary Diagnosis:	Lung cancer  Instructions for follow-up, activity and diet:	Follow with your oncologist as outpatient. Principal Discharge DX:	Pneumonia  Goal:	Continue with antibiotic treatment  Instructions for follow-up, activity and diet:	Pneumonia is a lung infection that can cause a fever, cough, and trouble breathing.  Continue all antibiotics as ordered until complete.  Nutrition is important, eat small frequent meals.  Get lots of rest and drink fluids.  Call your health care provider upon arrival home from hospital and make a follow up appointment for one week.  If your cough worsens, you develop fever greater than 101', you have shaking chills, a fast heartbeat, trouble breathing and/or feel your are breathing much faster than usual, call your healthcare provider.  Make sure you wash your hands frequently.  Secondary Diagnosis:	COPD (chronic obstructive pulmonary disease)  Instructions for follow-up, activity and diet:	Call your Health Care provider upon arrival home to make a follow up appointment within one week.  Take all inhalers as prescribed by your Health Care Provider.  Take steroids as prescribed by your Health Care Provider.  If your cough increases infrequency and severity and/or you have shortness of breath or increased shortness of breath call your Health Care Provider.  If you develop fever, chills, night sweats, malaise, and/or change in mental status call your Health care Provider.  Nutrition is very important.  Eat small frequent meals.  Increase your activity as tolerated.  Do not stay in bed all day  Secondary Diagnosis:	Hypertension  Instructions for follow-up, activity and diet:	Take medications for your blood pressure as recommended.  Eat a heart healthy diet that is low in saturated fats and salt, and includes whole grains, fruits, vegetables and lean protein   Exercise regularly (consult with your physician or cardiologist first); maintain a heart healthy weight.   If you smoke - quit (A resource to help you stop smoking is the [facility] – phone number [phone].). Continue to follow with your primary physician or cardiologist.   Seek medical help for dizziness, Lightheadedness, Blurry vision, Headache, Chest pain, Shortness of breath  Follow up with your medical doctor to establish long term blood pressure treatment goals.  Secondary Diagnosis:	Lung cancer  Instructions for follow-up, activity and diet:	Follow with your oncologist as outpatient. Principal Discharge DX:	Pneumonia  Goal:	Continue with antibiotic treatment  Instructions for follow-up, activity and diet:	Pneumonia is a lung infection that can cause a fever, cough, and trouble breathing.  Continue all antibiotics as ordered until complete.  Nutrition is important, eat small frequent meals.  Get lots of rest and drink fluids.  Call your health care provider upon arrival home from hospital and make a follow up appointment for one week.  If your cough worsens, you develop fever greater than 101', you have shaking chills, a fast heartbeat, trouble breathing and/or feel your are breathing much faster than usual, call your healthcare provider.  Make sure you wash your hands frequently.  Secondary Diagnosis:	COPD (chronic obstructive pulmonary disease)  Instructions for follow-up, activity and diet:	Call your Health Care provider upon arrival home to make a follow up appointment within one week.  Take all inhalers as prescribed by your Health Care Provider.  Take steroids as prescribed by your Health Care Provider.  If your cough increases infrequency and severity and/or you have shortness of breath or increased shortness of breath call your Health Care Provider.  If you develop fever, chills, night sweats, malaise, and/or change in mental status call your Health care Provider.  Nutrition is very important.  Eat small frequent meals.  Increase your activity as tolerated.  Do not stay in bed all day  Secondary Diagnosis:	Hypertension  Instructions for follow-up, activity and diet:	Take medications for your blood pressure as recommended.  Eat a heart healthy diet that is low in saturated fats and salt, and includes whole grains, fruits, vegetables and lean protein   Exercise regularly (consult with your physician or cardiologist first); maintain a heart healthy weight.   If you smoke - quit (A resource to help you stop smoking is the [facility] – phone number [phone].). Continue to follow with your primary physician or cardiologist.   Seek medical help for dizziness, Lightheadedness, Blurry vision, Headache, Chest pain, Shortness of breath  Follow up with your medical doctor to establish long term blood pressure treatment goals.  Secondary Diagnosis:	Lung cancer  Instructions for follow-up, activity and diet:	Follow with your oncologist as outpatient. Principal Discharge DX:	Pneumonia  Goal:	Continue with antibiotic treatment  Instructions for follow-up, activity and diet:	Pneumonia is a lung infection that can cause a fever, cough, and trouble breathing.  Continue all antibiotics as ordered until complete.  Nutrition is important, eat small frequent meals.  Get lots of rest and drink fluids.  Call your health care provider upon arrival home from hospital and make a follow up appointment for one week.  If your cough worsens, you develop fever greater than 101', you have shaking chills, a fast heartbeat, trouble breathing and/or feel your are breathing much faster than usual, call your healthcare provider.  Make sure you wash your hands frequently.  Secondary Diagnosis:	COPD (chronic obstructive pulmonary disease)  Goal:	Symptom management  Instructions for follow-up, activity and diet:	Call your Health Care provider upon arrival home to make a follow up appointment within one week.  Take all inhalers as prescribed by your Health Care Provider.  Take steroids as prescribed by your Health Care Provider.  If your cough increases infrequency and severity and/or you have shortness of breath or increased shortness of breath call your Health Care Provider.  If you develop fever, chills, night sweats, malaise, and/or change in mental status call your Health care Provider.  Nutrition is very important.  Eat small frequent meals.  Increase your activity as tolerated.  Do not stay in bed all day  Secondary Diagnosis:	Hypertension  Goal:	Follow up with your medical doctor to establish long term blood pressure treatment goals.  Instructions for follow-up, activity and diet:	Take medications for your blood pressure as recommended.  Eat a heart healthy diet that is low in saturated fats and salt, and includes whole grains, fruits, vegetables and lean protein   Exercise regularly (consult with your physician or cardiologist first); maintain a heart healthy weight.   If you smoke - quit (A resource to help you stop smoking is the [facility] – phone number [phone].). Continue to follow with your primary physician or cardiologist.   Seek medical help for dizziness, Lightheadedness, Blurry vision, Headache, Chest pain, Shortness of breath  Follow up with your medical doctor to establish long term blood pressure treatment goals.  Secondary Diagnosis:	Lung cancer  Instructions for follow-up, activity and diet:	Follow with your oncologist as outpatient. Principal Discharge DX:	Pneumonia  Goal:	Continue with antibiotic treatment  Instructions for follow-up, activity and diet:	Pneumonia is a lung infection that can cause a fever, cough, and trouble breathing.  Continue all antibiotics as ordered until complete.  Nutrition is important, eat small frequent meals.  Get lots of rest and drink fluids.  Call your health care provider upon arrival home from hospital and make a follow up appointment for one week.  If your cough worsens, you develop fever greater than 101', you have shaking chills, a fast heartbeat, trouble breathing and/or feel your are breathing much faster than usual, call your healthcare provider.  Make sure you wash your hands frequently.  Secondary Diagnosis:	COPD (chronic obstructive pulmonary disease)  Goal:	Symptom management  Instructions for follow-up, activity and diet:	Call your Health Care provider upon arrival home to make a follow up appointment within one week.  Take all inhalers as prescribed by your Health Care Provider.  Take steroids as prescribed by your Health Care Provider.  If your cough increases infrequency and severity and/or you have shortness of breath or increased shortness of breath call your Health Care Provider.  If you develop fever, chills, night sweats, malaise, and/or change in mental status call your Health care Provider.  Nutrition is very important.  Eat small frequent meals.  Increase your activity as tolerated.  Do not stay in bed all day  Secondary Diagnosis:	Hypertension  Goal:	Follow up with your medical doctor to establish long term blood pressure treatment goals.  Instructions for follow-up, activity and diet:	Take medications for your blood pressure as recommended.  Eat a heart healthy diet that is low in saturated fats and salt, and includes whole grains, fruits, vegetables and lean protein   Exercise regularly (consult with your physician or cardiologist first); maintain a heart healthy weight.   If you smoke - quit (A resource to help you stop smoking is the [facility] – phone number [phone].). Continue to follow with your primary physician or cardiologist.   Seek medical help for dizziness, Lightheadedness, Blurry vision, Headache, Chest pain, Shortness of breath  Follow up with your medical doctor to establish long term blood pressure treatment goals.  Secondary Diagnosis:	Lung cancer  Instructions for follow-up, activity and diet:	Follow with your oncologist as outpatient. Principal Discharge DX:	Pneumonia  Goal:	Continue with antibiotic treatment  Instructions for follow-up, activity and diet:	Pneumonia is a lung infection that can cause a fever, cough, and trouble breathing.  Continue all antibiotics as ordered until complete.  Nutrition is important, eat small frequent meals.  Get lots of rest and drink fluids.  Call your health care provider upon arrival home from hospital and make a follow up appointment for one week.  If your cough worsens, you develop fever greater than 101', you have shaking chills, a fast heartbeat, trouble breathing and/or feel your are breathing much faster than usual, call your healthcare provider.  Make sure you wash your hands frequently.  Secondary Diagnosis:	COPD (chronic obstructive pulmonary disease)  Goal:	Symptom management  Instructions for follow-up, activity and diet:	Call your Health Care provider upon arrival home to make a follow up appointment within one week.  Take all inhalers as prescribed by your Health Care Provider.  Take steroids as prescribed by your Health Care Provider.  If your cough increases infrequency and severity and/or you have shortness of breath or increased shortness of breath call your Health Care Provider.  If you develop fever, chills, night sweats, malaise, and/or change in mental status call your Health care Provider.  Nutrition is very important.  Eat small frequent meals.  Increase your activity as tolerated.  Do not stay in bed all day  Secondary Diagnosis:	Hypertension  Goal:	Follow up with your medical doctor to establish long term blood pressure treatment goals.  Instructions for follow-up, activity and diet:	Take medications for your blood pressure as recommended.  Eat a heart healthy diet that is low in saturated fats and salt, and includes whole grains, fruits, vegetables and lean protein   Exercise regularly (consult with your physician or cardiologist first); maintain a heart healthy weight.   If you smoke - quit (A resource to help you stop smoking is the [facility] – phone number [phone].). Continue to follow with your primary physician or cardiologist.   Seek medical help for dizziness, Lightheadedness, Blurry vision, Headache, Chest pain, Shortness of breath  Follow up with your medical doctor to establish long term blood pressure treatment goals.  Secondary Diagnosis:	Lung cancer  Instructions for follow-up, activity and diet:	Follow with your oncologist as outpatient. Principal Discharge DX:	Pneumonia  Goal:	Continue with antibiotic treatment  Instructions for follow-up, activity and diet:	Pneumonia is a lung infection that can cause a fever, cough, and trouble breathing.  Continue all antibiotics as ordered until complete.  Nutrition is important, eat small frequent meals.  Get lots of rest and drink fluids.  Call your health care provider upon arrival home from hospital and make a follow up appointment for one week.  If your cough worsens, you develop fever greater than 101', you have shaking chills, a fast heartbeat, trouble breathing and/or feel your are breathing much faster than usual, call your healthcare provider.  Make sure you wash your hands frequently.  Secondary Diagnosis:	COPD (chronic obstructive pulmonary disease)  Goal:	Symptom management  Instructions for follow-up, activity and diet:	Call your Health Care provider upon arrival home to make a follow up appointment within one week.  Take all inhalers as prescribed by your Health Care Provider.  Take steroids as prescribed by your Health Care Provider.  If your cough increases infrequency and severity and/or you have shortness of breath or increased shortness of breath call your Health Care Provider.  If you develop fever, chills, night sweats, malaise, and/or change in mental status call your Health care Provider.  Nutrition is very important.  Eat small frequent meals.  Increase your activity as tolerated.  Do not stay in bed all day  Secondary Diagnosis:	Hypertension  Goal:	Follow up with your medical doctor to establish long term blood pressure treatment goals.  Instructions for follow-up, activity and diet:	Take medications for your blood pressure as recommended.  Eat a heart healthy diet that is low in saturated fats and salt, and includes whole grains, fruits, vegetables and lean protein   Exercise regularly (consult with your physician or cardiologist first); maintain a heart healthy weight.   If you smoke - quit (A resource to help you stop smoking is the [facility] – phone number [phone].). Continue to follow with your primary physician or cardiologist.   Seek medical help for dizziness, Lightheadedness, Blurry vision, Headache, Chest pain, Shortness of breath  Follow up with your medical doctor to establish long term blood pressure treatment goals.  Secondary Diagnosis:	Lung cancer  Instructions for follow-up, activity and diet:	Follow with your oncologist as outpatient.

## 2017-07-20 NOTE — DISCHARGE NOTE ADULT - PATIENT PORTAL LINK FT
“You can access the Follow[facility] Patient Portal, offered by [facility], by registering with the following website: [email or web address]”

## 2017-07-20 NOTE — DISCHARGE NOTE ADULT - FINDINGS/TREATMENT
Conclusions:  1. Normal left ventricular internal dimensions and wall  thicknesses.  2. Normal left ventricular systolic function.  *** No previous Echo exam.

## 2017-07-20 NOTE — DISCHARGE NOTE ADULT - MEDICATION SUMMARY - MEDICATIONS TO STOP TAKING
I will STOP taking the medications listed below when I get home from the hospital:    Augmentin 875 mg-125 mg oral tablet  -- 1 tab(s) by mouth every 12 hours    Bystolic 5 mg oral tablet  -- 1 tab(s) by mouth once a day I will STOP taking the medications listed below when I get home from the hospital:    Augmentin 875 mg-125 mg oral tablet  -- 1 tab(s) by mouth every 12 hours

## 2017-07-20 NOTE — DISCHARGE NOTE ADULT - ADDITIONAL INSTRUCTIONS
You are discharged with antibiotics for a total of seven days. Please call and make an appointment with your primary medical doctor. We recommend you follow with You are discharged with antibiotics for a total of seven days. Please call and make an appointment with your primary medical doctor. We recommend you follow with your cardiologist, Dr. Wander Vasquez for outpatient cardiac (ischemic) work up. You are discharged with antibiotics for a total of 10 days. Please call and make an appointment with your primary medical doctor. We recommend you follow with your cardiologist, Dr. Wander Vasquez for outpatient cardiac (ischemic) work up.

## 2017-07-20 NOTE — DISCHARGE NOTE ADULT - MEDICATION SUMMARY - MEDICATIONS TO TAKE
I will START or STAY ON the medications listed below when I get home from the hospital:    predniSONE 10 mg oral tablet  -- 40 mg by mouth x2 days, then 30 mg by mouth x2 days, then 20 mg by mouth x2 days, then 10 mg by mouth daily.   -- It is very important that you take or use this exactly as directed.  Do not skip doses or discontinue unless directed by your doctor.  Obtain medical advice before taking any non-prescription drugs as some may affect the action of this medication.  Take with food or milk.    -- Indication: For Steroids    Cialis 20 mg oral tablet  -- 1 tab(s) by mouth once a day, As Needed  -- Indication: For Cardiovascular    tamsulosin 0.4 mg oral capsule  -- 1 cap(s) by mouth once a day (at bedtime)  -- Indication: For Prostate      Claritin 10 mg oral tablet  -- 1 tab(s) by mouth 2 times a day  -- Indication: For Antihistamines    albuterol 2.5 mg/3 mL (0.083%) inhalation solution  -- 3 milliliter(s) inhaled 2 times a day  -- Indication: For Bronchodilator    Spiriva 18 mcg inhalation capsule  -- 1 cap(s) inhaled once a day  -- Indication: For Bronchodilator    Advair Diskus 250 mcg-50 mcg inhalation powder  -- 1 puff(s) inhaled 2 times a day  -- Indication: For Bronchodilator    Norvasc 5 mg oral tablet  -- 1 tab(s) by mouth once a day  -- Indication: For High Blood Pressure    Mucinex 600 mg oral tablet, extended release  -- 1 tab(s) by mouth every 12 hours  -- Indication: For COugh    levoFLOXacin 500 mg oral tablet  -- 1 tab(s) by mouth every 24 hours  -- Indication: For Chest pain I will START or STAY ON the medications listed below when I get home from the hospital:    predniSONE 10 mg oral tablet  -- 40 mg by mouth x2 days, then 30 mg by mouth x2 days, then 20 mg by mouth x2 days, then 10 mg by mouth daily.  -- It is very important that you take or use this exactly as directed.  Do not skip doses or discontinue unless directed by your doctor.  Obtain medical advice before taking any non-prescription drugs as some may affect the action of this medication.  Take with food or milk.    -- Indication: For Steroids    Cialis 20 mg oral tablet  -- 1 tab(s) by mouth once a day, As Needed  -- Indication: For Cardiovascular    tamsulosin 0.4 mg oral capsule  -- 1 cap(s) by mouth once a day (at bedtime)  -- Indication: For Prostate      Claritin 10 mg oral tablet  -- 1 tab(s) by mouth 2 times a day  -- Indication: For Antihistamines    Bystolic 5 mg oral tablet  -- 1 tab(s) by mouth once a day  -- Indication: For High Blood Pressure    albuterol 2.5 mg/3 mL (0.083%) inhalation solution  -- 3 milliliter(s) inhaled 2 times a day  -- Indication: For Bronchodilator    Spiriva 18 mcg inhalation capsule  -- 1 cap(s) inhaled once a day  -- Indication: For Bronchodilator    Advair Diskus 250 mcg-50 mcg inhalation powder  -- 1 puff(s) inhaled 2 times a day  -- Indication: For Bronchodilator    Norvasc 5 mg oral tablet  -- 1 tab(s) by mouth once a day  -- Indication: For High Blood Pressure    Mucinex 600 mg oral tablet, extended release  -- 1 tab(s) by mouth every 12 hours  -- Indication: For COugh    levoFLOXacin 500 mg oral tablet  -- 1 tab(s) by mouth every 24 hours  -- Indication: For Antibiotics

## 2017-07-21 VITALS
SYSTOLIC BLOOD PRESSURE: 127 MMHG | RESPIRATION RATE: 18 BRPM | DIASTOLIC BLOOD PRESSURE: 82 MMHG | TEMPERATURE: 98 F | OXYGEN SATURATION: 95 % | HEART RATE: 90 BPM

## 2017-07-21 PROCEDURE — 71275 CT ANGIOGRAPHY CHEST: CPT

## 2017-07-21 PROCEDURE — 84484 ASSAY OF TROPONIN QUANT: CPT

## 2017-07-21 PROCEDURE — 82803 BLOOD GASES ANY COMBINATION: CPT

## 2017-07-21 PROCEDURE — 83880 ASSAY OF NATRIURETIC PEPTIDE: CPT

## 2017-07-21 PROCEDURE — 82553 CREATINE MB FRACTION: CPT

## 2017-07-21 PROCEDURE — 94640 AIRWAY INHALATION TREATMENT: CPT

## 2017-07-21 PROCEDURE — 85027 COMPLETE CBC AUTOMATED: CPT

## 2017-07-21 PROCEDURE — 85730 THROMBOPLASTIN TIME PARTIAL: CPT

## 2017-07-21 PROCEDURE — 71046 X-RAY EXAM CHEST 2 VIEWS: CPT

## 2017-07-21 PROCEDURE — 93005 ELECTROCARDIOGRAM TRACING: CPT

## 2017-07-21 PROCEDURE — 84100 ASSAY OF PHOSPHORUS: CPT

## 2017-07-21 PROCEDURE — 81001 URINALYSIS AUTO W/SCOPE: CPT

## 2017-07-21 PROCEDURE — 84295 ASSAY OF SERUM SODIUM: CPT

## 2017-07-21 PROCEDURE — 85610 PROTHROMBIN TIME: CPT

## 2017-07-21 PROCEDURE — 82330 ASSAY OF CALCIUM: CPT

## 2017-07-21 PROCEDURE — 87086 URINE CULTURE/COLONY COUNT: CPT

## 2017-07-21 PROCEDURE — 93306 TTE W/DOPPLER COMPLETE: CPT

## 2017-07-21 PROCEDURE — 96374 THER/PROPH/DIAG INJ IV PUSH: CPT

## 2017-07-21 PROCEDURE — 80053 COMPREHEN METABOLIC PANEL: CPT

## 2017-07-21 PROCEDURE — 99285 EMERGENCY DEPT VISIT HI MDM: CPT | Mod: 25

## 2017-07-21 PROCEDURE — 82947 ASSAY GLUCOSE BLOOD QUANT: CPT

## 2017-07-21 PROCEDURE — 85014 HEMATOCRIT: CPT

## 2017-07-21 PROCEDURE — 84132 ASSAY OF SERUM POTASSIUM: CPT

## 2017-07-21 PROCEDURE — 83605 ASSAY OF LACTIC ACID: CPT

## 2017-07-21 PROCEDURE — 87040 BLOOD CULTURE FOR BACTERIA: CPT

## 2017-07-21 PROCEDURE — 82435 ASSAY OF BLOOD CHLORIDE: CPT

## 2017-07-21 PROCEDURE — 94010 BREATHING CAPACITY TEST: CPT

## 2017-07-21 PROCEDURE — 82550 ASSAY OF CK (CPK): CPT

## 2017-07-21 RX ORDER — TAMSULOSIN HYDROCHLORIDE 0.4 MG/1
1 CAPSULE ORAL
Qty: 30 | Refills: 0 | OUTPATIENT
Start: 2017-07-21 | End: 2017-08-20

## 2017-07-21 RX ORDER — RANITIDINE HYDROCHLORIDE 150 MG/1
1 TABLET, FILM COATED ORAL
Qty: 0 | Refills: 0 | COMMUNITY

## 2017-07-21 RX ORDER — NEBIVOLOL HYDROCHLORIDE 5 MG/1
1 TABLET ORAL
Qty: 0 | Refills: 0 | COMMUNITY

## 2017-07-21 RX ORDER — NEBIVOLOL HYDROCHLORIDE 5 MG/1
1 TABLET ORAL
Qty: 0 | Refills: 0 | COMMUNITY
Start: 2017-07-21

## 2017-07-21 RX ADMIN — Medication 50 MILLIGRAM(S): at 06:08

## 2017-07-21 RX ADMIN — Medication 3 MILLILITER(S): at 06:08

## 2017-07-21 RX ADMIN — Medication 3 MILLILITER(S): at 10:48

## 2017-07-21 RX ADMIN — Medication 0.5 MILLIGRAM(S): at 06:08

## 2017-07-21 RX ADMIN — Medication 1200 MILLIGRAM(S): at 06:08

## 2017-07-21 RX ADMIN — AMLODIPINE BESYLATE 5 MILLIGRAM(S): 2.5 TABLET ORAL at 06:08

## 2017-07-21 RX ADMIN — LORATADINE 10 MILLIGRAM(S): 10 TABLET ORAL at 11:34

## 2017-07-21 RX ADMIN — Medication 1 SPRAY(S): at 06:08

## 2017-07-21 RX ADMIN — FAMOTIDINE 20 MILLIGRAM(S): 10 INJECTION INTRAVENOUS at 11:34

## 2017-07-21 NOTE — PROGRESS NOTE ADULT - SUBJECTIVE AND OBJECTIVE BOX
Subjective: Patient seen and examined. No new events except as noted.     SUBJECTIVE/ROS:    feels better    MEDICATIONS:  MEDICATIONS  (STANDING):  predniSONE   Tablet 50 milliGRAM(s) Oral daily  loratadine 10 milliGRAM(s) Oral daily  fluticasone propionate 50 MICROgram(s)/spray Nasal Spray 1 Spray(s) Both Nostrils two times a day  guaiFENesin ER 1200 milliGRAM(s) Oral every 12 hours  nebivolol 5 milliGRAM(s) Oral daily  amLODIPine   Tablet 5 milliGRAM(s) Oral daily  famotidine    Tablet 20 milliGRAM(s) Oral daily  levoFLOXacin IVPB 500 milliGRAM(s) IV Intermittent every 24 hours  tamsulosin 0.4 milliGRAM(s) Oral at bedtime  ALBUTerol/ipratropium for Nebulization 3 milliLiter(s) Nebulizer <User Schedule>  buDESOnide   0.5 milliGRAM(s) Respule 0.5 milliGRAM(s) Inhalation every 12 hours      PHYSICAL EXAM:  T(C): 36.5 (07-20-17 @ 11:49), Max: 36.9 (07-19-17 @ 21:31)  HR: 82 (07-20-17 @ 11:49) (82 - 98)  BP: 107/63 (07-20-17 @ 11:49) (107/63 - 131/87)  RR: 18 (07-20-17 @ 13:28) (16 - 20)  SpO2: 96% (07-20-17 @ 13:28) (92% - 96%)  Wt(kg): --  I&O's Summary    19 Jul 2017 07:01  -  20 Jul 2017 07:00  --------------------------------------------------------  IN: 50 mL / OUT: 0 mL / NET: 50 mL    20 Jul 2017 07:01  -  20 Jul 2017 15:20  --------------------------------------------------------  IN: 580 mL / OUT: 0 mL / NET: 580 mL      Height (cm): 177.8 (07-19 @ 19:45)  Weight (kg): 77.1 (07-19 @ 19:45)  BMI (kg/m2): 24.4 (07-19 @ 19:45)  BSA (m2): 1.95 (07-19 @ 19:45)    Appearance: Normal	  HEENT:   Normal oral mucosa, PERRL, EOMI	  Cardiovascular: Normal S1 S2,    Murmur:   Neck: JVP normal  Respiratory: Lungs clear to auscultation  Gastrointestinal:  Soft, Non-tender, + BS	  Skin: normal   Neuro: No gross deficits.   Psychiatry:  Mood & affect appropriate  Ext: No edema        LABS:    CARDIAC MARKERS:  CARDIAC MARKERS ( 20 Jul 2017 00:20 )  x     / <0.01 ng/mL / x     / x     / 2.7 ng/mL  CARDIAC MARKERS ( 19 Jul 2017 18:20 )  x     / <0.01 ng/mL / x     / x     / 2.8 ng/mL  CARDIAC MARKERS ( 19 Jul 2017 12:54 )  x     / <0.01 ng/mL / 98 U/L / x     / 2.7 ng/mL                                12.2   20.44 )-----------( 234      ( 20 Jul 2017 07:07 )             35.8     07-20    137  |  100  |  27<H>  ----------------------------<  134<H>  5.0   |  23  |  1.03    Ca    8.9      20 Jul 2017 07:38  Phos  3.0     07-20    TPro  6.6  /  Alb  3.1<L>  /  TBili  0.7  /  DBili  x   /  AST  15  /  ALT  17  /  AlkPhos  62  07-20    proBNP:   Lipid Profile:   HgA1c:   TSH:           TELEMETRY: 	    ECG:  	  RADIOLOGY:   DIAGNOSTIC TESTING:  Echocardiogram:  Catheterization:  Stress Test:    OTHER:
Subjective: Patient seen and examined. No new events except as noted.     SUBJECTIVE/ROS:  No chest pain, or sob.     MEDICATIONS:  MEDICATIONS  (STANDING):  predniSONE   Tablet 50 milliGRAM(s) Oral daily  loratadine 10 milliGRAM(s) Oral daily  fluticasone propionate 50 MICROgram(s)/spray Nasal Spray 1 Spray(s) Both Nostrils two times a day  guaiFENesin ER 1200 milliGRAM(s) Oral every 12 hours  nebivolol 5 milliGRAM(s) Oral daily  amLODIPine   Tablet 5 milliGRAM(s) Oral daily  famotidine    Tablet 20 milliGRAM(s) Oral daily  levoFLOXacin IVPB 500 milliGRAM(s) IV Intermittent every 24 hours  tamsulosin 0.4 milliGRAM(s) Oral at bedtime  ALBUTerol/ipratropium for Nebulization 3 milliLiter(s) Nebulizer <User Schedule>  buDESOnide   0.5 milliGRAM(s) Respule 0.5 milliGRAM(s) Inhalation every 12 hours      PHYSICAL EXAM:  T(C): 36.5 (07-21-17 @ 04:16), Max: 36.5 (07-20-17 @ 11:49)  HR: 79 (07-21-17 @ 04:16) (79 - 82)  BP: 116/69 (07-21-17 @ 04:16) (107/63 - 118/73)  RR: 18 (07-21-17 @ 04:16) (16 - 20)  SpO2: 94% (07-21-17 @ 04:16) (92% - 96%)  Wt(kg): --  I&O's Summary    20 Jul 2017 07:01  -  21 Jul 2017 07:00  --------------------------------------------------------  IN: 1090 mL / OUT: 0 mL / NET: 1090 mL          Appearance: Normal	  HEENT:   Normal oral mucosa, PERRL, EOMI	  Cardiovascular: Normal S1 S2,    Murmur:   Neck: JVP normal  Respiratory: Lungs clear to auscultation  Gastrointestinal:  Soft, Non-tender, + BS	  Skin: normal   Neuro: No gross deficits.   Psychiatry:  Mood & affect appropriate  Ext: No edema        LABS:    CARDIAC MARKERS:  CARDIAC MARKERS ( 20 Jul 2017 00:20 )  x     / <0.01 ng/mL / x     / x     / 2.7 ng/mL  CARDIAC MARKERS ( 19 Jul 2017 18:20 )  x     / <0.01 ng/mL / x     / x     / 2.8 ng/mL  CARDIAC MARKERS ( 19 Jul 2017 12:54 )  x     / <0.01 ng/mL / 98 U/L / x     / 2.7 ng/mL                                12.2   20.44 )-----------( 234      ( 20 Jul 2017 07:07 )             35.8     07-20    137  |  100  |  27<H>  ----------------------------<  134<H>  5.0   |  23  |  1.03    Ca    8.9      20 Jul 2017 07:38  Phos  3.0     07-20    TPro  6.6  /  Alb  3.1<L>  /  TBili  0.7  /  DBili  x   /  AST  15  /  ALT  17  /  AlkPhos  62  07-20    proBNP:   Lipid Profile:   HgA1c:   TSH:           TELEMETRY: 	    ECG:  	  RADIOLOGY:   DIAGNOSTIC TESTING:  Echocardiogram:  Catheterization:  Stress Test:    OTHER:
[facility]     PROGRESS NOTE    CHIEF COMPLAINT: COPD exacerbation; pneumonia    INTERVAL HISTORY: improved; no further headache, chest pain or anxiety; mild chest congestion and wheeze without shortness of breath at rest; minimal cough and sputum production; no fevers, chills or sweats; no chest pressure or palpitations    REVIEW OF SYSTEMS:  Constitutional: As per interval history  HEENT: Within normal limits  CV: As per interval history  Resp: As per interval history  GI: Within normal limits   : Within normal limits  Musculoskeletal: Within normal limits  Skin: Within normal limits  Neurological: Within normal limits  Psychiatric: Within normal limits  Endocrine: Within normal limits  Hematologic/Lymphatic: Within normal limits  Allergic/Immunologic: Within normal limits      MEDICATIONS:     Pulmonary "  loratadine 10 milliGRAM(s) Oral daily  guaiFENesin ER 1200 milliGRAM(s) Oral every 12 hours  ALBUTerol/ipratropium for Nebulization 3 milliLiter(s) Nebulizer <User Schedule>      Anti-microbials:  levoFLOXacin IVPB 500 milliGRAM(s) IV Intermittent every 24 hours      Cardiovascular:  nebivolol 5 milliGRAM(s) Oral daily  amLODIPine   Tablet 5 milliGRAM(s) Oral daily  tamsulosin 0.4 milliGRAM(s) Oral at bedtime      Other:  predniSONE   Tablet 50 milliGRAM(s) Oral daily  fluticasone propionate 50 MICROgram(s)/spray Nasal Spray 1 Spray(s) Both Nostrils two times a day  famotidine    Tablet 20 milliGRAM(s) Oral daily        OBJECTIVE:        I&O's Detail    2017 07:01  -  2017 07:00  --------------------------------------------------------  IN:    Oral Fluid: 50 mL  Total IN: 50 mL    OUT:  Total OUT: 0 mL    Total NET: 50 mL          Daily Height in cm: 177.8 (2017 19:45)    Daily Weight in k.3 (2017 07:25)      PHYSICAL EXAM:       ICU Vital Signs Last 24 Hrs  T(C): 36.9 (2017 04:16), Max: 36.9 (2017 21:31)  T(F): 98.5 (2017 04:16), Max: 98.5 (2017 21:31)  HR: 87 (2017 04:16) (86 - 100)  BP: 113/75 (2017 04:16) (101/57 - 131/87)  BP(mean): --  ABP: --  ABP(mean): --  RR: 18 (2017 04:16) (16 - 24)  SpO2: 94% (2017 04:16) (93% - 96%) on 2lpm     General: Awake. Alert. Cooperative. No distress. Appears stated age 	  HEENT:   AT/NC/Anicteric. PERRL/EOMI. Normal oral mucosa,  Neck: Supple. Trachea midline. Thyroid without enlargement/tenderness/nodules. No carotid bruit. No JVD	  Cardiovascular: Regular rate and rhythm. Distant S1 S2 normal. No M/R/G  Respiratory: Respirations unlabored. Increased respiratory phase of respiration with decreased breath sounds throughout and mild rhonchi and wheeze; Barrel chested  Abdomen: Soft. Non-tender. Non-distended. No organomegaly. No masses. Normal BS  Extremities: Warm to touch. No CCE  Pulses: 2+ peripheral pulses all extremities	  Skin: Normal skin color. No rashes or lesions. No ecchymoses. No cyanosis.  Warm to touch  Lymph Nodes: Cervical, supraclavicular and axillary nodes normal  Neurological: Motor and sensory examination equal and normal. A and O x 3  Psychiatry: Appropriate mood and affect.      LABS:                        12.2   20.44 )-----------( 234      ( 2017 07:07 )             35.8                         13.5   27.2  )-----------( 291      ( 2017 12:54 )             40.8     -19    134<L>  |  95<L>  |  27<H>  ----------------------------<  129<H>  4.4   |  24  |  1.34<H>    Ca      9.3            TPro  7.3  /  Alb  4.0  /  TBili  1.2  /  DBili  x   /  AST  18  /  ALT  21  /  AlkPhos  64      PT/INR - ( 2017 12:54 )   PT: 13.3 sec;   INR: 1.23 ratio         PTT - ( 2017 12:54 )  PTT:28.0 sec    Venous Blood Gas:   @ 12:54  7.38/45/28/26/39  VBG Lactate: 1.6    Serum Pro-Brain Natriuretic Peptide: 768 pg/mL ( @ 12:54)    CARDIAC MARKERS ( 2017 00:20 )  x     / <0.01 ng/mL / x     / x     / 2.7 ng/mL  CARDIAC MARKERS ( 2017 18:20 )  x     / <0.01 ng/mL / x     / x     / 2.8 ng/mL  CARDIAC MARKERS ( 2017 12:54 )  x     / <0.01 ng/mL / 98 U/L / x     / 2.7 ng/mL          MICROBIOLOGY:   Urinalysis Basic - ( 2017 15:02 )    Color: x / Appearance: Clear / S.007 / pH: x  Gluc: x / Ketone: Negative  / Bili: Negative / Urobili: Negative   Blood: x / Protein: Negative / Nitrite: Negative   Leuk Esterase: Negative / RBC: 0-2 /HPF / WBC 0-2 /HPF   Sq Epi: x / Non Sq Epi: x / Bacteria: Few /HPF        RADIOLOGY:  [x ] Chest radiographs reviewed and interpreted by me    < from: Xray Chest 2 Views PA/Lat (17 @ 13:50) >    EXAM:  CHEST PA & LAT                            PROCEDURE DATE:  2017        INTERPRETATION:  CLINICAL INFORMATION: Chest pain and cough    EXAM: AP view of the chest  2017    COMPARISON: No radiographs are available for comparison at this time.    FINDINGS:    The heart is normal in size  There are no focal lung consolidations.  There is no pleural effusion.  There is no pneumothorax.    IMPRESSION:    No focal lung consolidation      SARITA MATHEWS M.D., RADIOLOGY RESIDENT  This document has been electronically signed.  HADLEY CARNEY M.D., ATTENDING RADIOLOGIST  This document has been electronically signed. 2017  2:30PM      < end of copied text >  ---------------------------------------------------------------------------------------------------------  < from: CT Angio Chest w/ IV Cont (17 @ 18:31) >    EXAM:  CT ANGIO CHEST (W)AW IC                            PROCEDURE DATE:  2017      INTERPRETATION:  CLINICAL INFORMATION: Shortness of breath for one week   and chest pain. Evaluate for pulmonary embolus.    COMPARISON: None available.    PROCEDURE:   CT Angiography of the Chest.  90 ml of Omnipaque 350 was injected intravenously. 10 ml were discarded.  Sagittal and coronal reformats were performed as well as 3D (MIP)   reconstructions.    FINDINGS:    CHEST:     LUNGS AND LARGE AIRWAYS: Status post resection of the lingula.  Severe   centrilobular emphysema. Mucoid impaction of bronchi, particularly in the   lower lobes bilaterally. Multiple less than 1 cm nodular opacities,   particularly in the left lower lobe. For example,a peripheral left lower   lobe nodule measures 8 mm (3:369). A 9 mm peripheral nodule is noted in   the right lower lobe (3:284)    PLEURA: No pleural effusion.  VESSELS: No pulmonary embolus. Atherosclerotic calcification of the aorta   and coronaryarteries.   HEART: Heart size is normal. No pericardial effusion.  MEDIASTINUM AND AIDAN: No lymphadenopathy.  CHEST WALL AND LOWER NECK: Within normal limits.  VISUALIZED UPPER ABDOMEN: An indeterminate 1.8 cm hypodense lesion is   noted in the inferior aspect of the spleen.  BONES: Degenerative changes in the spine.    IMPRESSION: No pulmonary embolus.    Severe centrilobular emphysema.    Mucoid impaction of bronchi, particularly in the lower lobes bilaterally.    Bilateral subcentimeter nodular opacities, particularly in the lower   lobes bilaterally, left greater than right. These may be secondary to an   infectious etiology. However, close interval follow-up is recommended   after treatment to assess for resolution.    Indeterminate 1.8 cm hypodense lesion in the spleen. Comparison with any   prior studies is suggested.    These findings were discussed with Dr. Burton on 2017 at 5:36 PM   by Dr. Painting with read back confirmation.    MELISSA PAINTING M.D., ATTENDING RADIOLOGIST  This document has been electronically signed. 2017  5:48PM      < end of copied text >  ---------------------------------------------------------------------------------------------------------
[facility] - [facility]     PROGRESS NOTE    CHIEF COMPLAINT: COPD exacerbation; pneumonia    INTERVAL HISTORY: improved; no further headache, chest pain or anxiety; mild chest congestion and wheeze without shortness of breath at rest with significant exertion; minimal cough and sputum production; no fevers, chills or sweats; no chest pressure or palpitations    REVIEW OF SYSTEMS:  Constitutional: As per interval history  HEENT: Within normal limits  CV: As per interval history  Resp: As per interval history  GI: Within normal limits   : Within normal limits  Musculoskeletal: Within normal limits  Skin: Within normal limits  Neurological: Within normal limits  Psychiatric: Within normal limits  Endocrine: Within normal limits  Hematologic/Lymphatic: Within normal limits  Allergic/Immunologic: Within normal limits      MEDICATIONS:     Pulmonary "  loratadine 10 milliGRAM(s) Oral daily  guaiFENesin ER 1200 milliGRAM(s) Oral every 12 hours      Anti-microbials:  levoFLOXacin  Tablet 500 milliGRAM(s) Oral every 24 hours      Cardiovascular:  nebivolol 5 milliGRAM(s) Oral daily  amLODIPine   Tablet 5 milliGRAM(s) Oral daily  tamsulosin 0.4 milliGRAM(s) Oral at bedtime      Other:  fluticasone propionate 50 MICROgram(s)/spray Nasal Spray 1 Spray(s) Both Nostrils two times a day  famotidine    Tablet 20 milliGRAM(s) Oral daily        OBJECTIVE:        I&O's Detail    2017 07:  -  2017 07:00  --------------------------------------------------------  IN:    IV PiggyBack: 100 mL    Oral Fluid: 990 mL  Total IN: 1090 mL    OUT:  Total OUT: 0 mL    Total NET: 1090 mL      2017 07:  -  2017 13:33  --------------------------------------------------------  IN:    Oral Fluid: 380 mL  Total IN: 380 mL    OUT:  Total OUT: 0 mL    Total NET: 380 mL          Daily     Daily Weight in k (2017 07:30)    CAPILLARY BLOOD GLUCOSE          PHYSICAL EXAM:       ICU Vital Signs Last 24 Hrs  T(C): 36.6 (2017 07:30), Max: 36.6 (2017 07:30)  T(F): 97.9 (2017 07:30), Max: 97.9 (2017 07:30)  HR: 90 (2017 07:30) (79 - 90)  BP: 127/82 (2017 07:30) (116/69 - 127/82)  BP(mean): --  ABP: --  ABP(mean): --  RR: 18 (2017 07:30) (18 - 18)  SpO2: 95% (2017 07:30) (94% - 95%) on room air     General: Awake. Alert. Cooperative. No distress. Appears stated age 	  HEENT:   AT/NC/Anicteric. PERRL/EOMI. Normal oral mucosa,  Neck: Supple. Trachea midline. Thyroid without enlargement/tenderness/nodules. No carotid bruit. No JVD	  Cardiovascular: Regular rate and rhythm. Distant S1 S2 normal. No M/R/G  Respiratory: Respirations unlabored. Increased respiratory phase of respiration with decreased breath sounds throughout and mild rhonchi and wheeze; Barrel chested  Abdomen: Soft. Non-tender. Non-distended. No organomegaly. No masses. Normal BS  Extremities: Warm to touch. No CCE  Pulses: 2+ peripheral pulses all extremities	  Skin: Normal skin color. No rashes or lesions. No ecchymoses. No cyanosis.  Warm to touch  Lymph Nodes: Cervical, supraclavicular and axillary nodes normal  Neurological: Motor and sensory examination equal and normal. A and O x 3  Psychiatry: Appropriate mood and affect.      LABS:                        12.2   20.44 )-----------( 234      ( 2017 07:07 )             35.8     07-20    137  |  100  |  27<H>  ----------------------------<  134<H>  5.0   |  23  |  1.03  07-19    134<L>  |  95<L>  |  27<H>  ----------------------------<  129<H>  4.4   |  24  |  1.34<H>    Ca      8.9          Ca      9.3          Phos    3.0           TPro  6.6  /  Alb  3.1<L>  /  TBili  0.7  /  DBili  x   /  AST  15  /  ALT  17  /  AlkPhos  62    TPro  7.3  /  Alb  4.0  /  TBili  1.2  /  DBili  x   /  AST  18  /  ALT  21  /  AlkPhos  64      Serum Pro-Brain Natriuretic Peptide: 768 pg/mL ( @ 12:54)    CARDIAC MARKERS ( 2017 00:20 )  x     / <0.01 ng/mL / x     / x     / 2.7 ng/mL  CARDIAC MARKERS ( 2017 18:20 )  x     / <0.01 ng/mL / x     / x     / 2.8 ng/mL  CARDIAC MARKERS ( 2017 12:54 )  x     / <0.01 ng/mL / 98 U/L / x     / 2.7 ng/mL      MICROBIOLOGY:   Urinalysis Basic - ( 2017 15:02 )    Color: x / Appearance: Clear / S.007 / pH: x  Gluc: x / Ketone: Negative  / Bili: Negative / Urobili: Negative   Blood: x / Protein: Negative / Nitrite: Negative   Leuk Esterase: Negative / RBC: 0-2 /HPF / WBC 0-2 /HPF   Sq Epi: x / Non Sq Epi: x / Bacteria: Few /HPF        RADIOLOGY:  [x ] Chest radiographs reviewed and interpreted by me    < from: Xray Chest 2 Views PA/Lat (17 @ 13:50) >    EXAM:  CHEST PA & LAT                            PROCEDURE DATE:  2017        INTERPRETATION:  CLINICAL INFORMATION: Chest pain and cough    EXAM: AP view of the chest  2017    COMPARISON: No radiographs are available for comparison at this time.    FINDINGS:    The heart is normal in size  There are no focal lung consolidations.  There is no pleural effusion.  There is no pneumothorax.    IMPRESSION:    No focal lung consolidation      SARITA MATHEWS M.D., RADIOLOGY RESIDENT  This document has been electronically signed.  HADLEY CARNEY M.D., ATTENDING RADIOLOGIST  This document has been electronically signed. 2017  2:30PM      < end of copied text >  ---------------------------------------------------------------------------------------------------------  < from: CT Angio Chest w/ IV Cont (17 @ 18:31) >    EXAM:  CT ANGIO CHEST (W)AW IC                            PROCEDURE DATE:  2017      INTERPRETATION:  CLINICAL INFORMATION: Shortness of breath for one week   and chest pain. Evaluate for pulmonary embolus.    COMPARISON: None available.    PROCEDURE:   CT Angiography of the Chest.  90 ml of Omnipaque 350 was injected intravenously. 10 ml were discarded.  Sagittal and coronal reformats were performed as well as 3D (MIP)   reconstructions.    FINDINGS:    CHEST:     LUNGS AND LARGE AIRWAYS: Status post resection of the lingula.  Severe   centrilobular emphysema. Mucoid impaction of bronchi, particularly in the   lower lobes bilaterally. Multiple less than 1 cm nodular opacities,   particularly in the left lower lobe. For example,a peripheral left lower   lobe nodule measures 8 mm (3:369). A 9 mm peripheral nodule is noted in   the right lower lobe (3:284)    PLEURA: No pleural effusion.  VESSELS: No pulmonary embolus. Atherosclerotic calcification of the aorta   and coronaryarteries.   HEART: Heart size is normal. No pericardial effusion.  MEDIASTINUM AND AIDAN: No lymphadenopathy.  CHEST WALL AND LOWER NECK: Within normal limits.  VISUALIZED UPPER ABDOMEN: An indeterminate 1.8 cm hypodense lesion is   noted in the inferior aspect of the spleen.  BONES: Degenerative changes in the spine.    IMPRESSION: No pulmonary embolus.    Severe centrilobular emphysema.    Mucoid impaction of bronchi, particularly in the lower lobes bilaterally.    Bilateral subcentimeter nodular opacities, particularly in the lower   lobes bilaterally, left greater than right. These may be secondary to an   infectious etiology. However, close interval follow-up is recommended   after treatment to assess for resolution.    Indeterminate 1.8 cm hypodense lesion in the spleen. Comparison with any   prior studies is suggested.    These findings were discussed with Dr. Burton on 2017 at 5:36 PM   by Dr. Painting with read back confirmation.    MELISSA PAINTING M.D., ATTENDING RADIOLOGIST  This document has been electronically signed. 2017  5:48PM      < end of copied text >  ---------------------------------------------------------------------------------------------------------
Patient is a 72y old  Male who presents with a chief complaint of sob and chest tightness (2017 18:52)      INTERVAL HPI/OVERNIGHT EVENTS:    REVIEW OF SYSTEMS:    CONSTITUTIONAL: No weakness, fevers or chills  RESPIRATORY: +  cough, wheezing, improving shortness of breath  CARDIOVASCULAR: No chest pain or palpitations  GASTROINTESTINAL: No abdominal  pain . No nausea, vomiting, or hematemesis; No diarrhea or constipation. No melena or hematochezia.  GENITOURINARY: No dysuria, frequency or hematuria  NEUROLOGICAL: No numbness or weakness       Medications:   MEDICATIONS  (STANDING):  predniSONE   Tablet 50 milliGRAM(s) Oral daily  loratadine 10 milliGRAM(s) Oral daily  fluticasone propionate 50 MICROgram(s)/spray Nasal Spray 1 Spray(s) Both Nostrils two times a day  guaiFENesin ER 1200 milliGRAM(s) Oral every 12 hours  nebivolol 5 milliGRAM(s) Oral daily  amLODIPine   Tablet 5 milliGRAM(s) Oral daily  famotidine    Tablet 20 milliGRAM(s) Oral daily  levoFLOXacin IVPB 500 milliGRAM(s) IV Intermittent every 24 hours  tamsulosin 0.4 milliGRAM(s) Oral at bedtime  ALBUTerol/ipratropium for Nebulization 3 milliLiter(s) Nebulizer <User Schedule>  buDESOnide   0.5 milliGRAM(s) Respule 0.5 milliGRAM(s) Inhalation every 12 hours    MEDICATIONS  (PRN):      Allergies    No Known Allergies    Intolerances          Vital Signs Last 24 Hrs  T(C): 36.5 (2017 11:49), Max: 36.9 (2017 21:31)  T(F): 97.7 (2017 11:49), Max: 98.5 (2017 21:31)  HR: 82 (2017 11:49) (82 - 98)  BP: 107/63 (2017 11:49) (107/63 - 113/75)  BP(mean): --  RR: 18 (2017 13:28) (16 - 20)  SpO2: 96% (2017 13:28) (92% - 96%)  CAPILLARY BLOOD GLUCOSE          07-19 @ 07:01  -  -20 @ 07:00  --------------------------------------------------------  IN: 50 mL / OUT: 0 mL / NET: 50 mL     @ 07:01  -   @ 20:17  --------------------------------------------------------  IN: 940 mL / OUT: 0 mL / NET: 940 mL        Physical Exam:    Daily Weight in k.3 (2017 07:25)  General:   NAD  HEENT:  Nonicteric, PERRLA  CV:  RRR, S1S2   Lungs:  decreased air entry   no wheezing  mild ronchi   Abdomen:  Soft, non-tender, no distended, positive BS  Extremities:, no edema  Neuro:  AAOx3, non-focal, grossly intact      LABS:                        12.2   20.44 )-----------( 234      ( 2017 07:07 )             35.8     07-20    137  |  100  |  27<H>  ----------------------------<  134<H>  5.0   |  23  |  1.03    Ca    8.9      2017 07:38  Phos  3.0     20    TPro  6.6  /  Alb  3.1<L>  /  TBili  0.7  /  DBili  x   /  AST  15  /  ALT  17  /  AlkPhos  62  07-20    PT/INR - ( 2017 12:54 )   PT: 13.3 sec;   INR: 1.23 ratio         PTT - ( 2017 12:54 )  PTT:28.0 sec  Urinalysis Basic - ( 2017 15:02 )    Color: x / Appearance: Clear / S.007 / pH: x  Gluc: x / Ketone: Negative  / Bili: Negative / Urobili: Negative   Blood: x / Protein: Negative / Nitrite: Negative   Leuk Esterase: Negative / RBC: 0-2 /HPF / WBC 0-2 /HPF   Sq Epi: x / Non Sq Epi: x / Bacteria: Few /HPF              RADIOLOGY & ADDITIONAL TESTS:    ---------------------------------------------------------------------------  I personally reviewed: [  ]EKG   [  ]CXR    [  ] CT    [  ]Other  ---------------------------------------------------------------------------
Patient is a 72y old  Male who presents with a chief complaint of sob and chest tightness (2017 18:52)      INTERVAL HPI/OVERNIGHT EVENTS:    REVIEW OF SYSTEMS:    CONSTITUTIONAL: No weakness, fevers or chills  RESPIRATORY:imrpving cough , wheezing,  No shortness of breath  CARDIOVASCULAR: No chest pain or palpitations  GASTROINTESTINAL: No abdominal  pain  . No nausea, vomiting, or hematemesis; No diarrhea or constipation. No melena or hematochezia.  GENITOURINARY: No dysuria, frequency   NEUROLOGICAL: No numbness or weakness        Medications:   MEDICATIONS  (STANDING):  loratadine 10 milliGRAM(s) Oral daily  fluticasone propionate 50 MICROgram(s)/spray Nasal Spray 1 Spray(s) Both Nostrils two times a day  guaiFENesin ER 1200 milliGRAM(s) Oral every 12 hours  nebivolol 5 milliGRAM(s) Oral daily  amLODIPine   Tablet 5 milliGRAM(s) Oral daily  famotidine    Tablet 20 milliGRAM(s) Oral daily  tamsulosin 0.4 milliGRAM(s) Oral at bedtime  levoFLOXacin  Tablet 500 milliGRAM(s) Oral every 24 hours    MEDICATIONS  (PRN):      Allergies    No Known Allergies    Intolerances          Vital Signs Last 24 Hrs  T(C): 36.6 (2017 12:30), Max: 36.6 (2017 12:30)  T(F): 97.9 (2017 12:30), Max: 97.9 (2017 12:30)  HR: 90 (2017 12:30) (79 - 90)  BP: 127/82 (2017 12:30) (116/69 - 127/82)  BP(mean): --  RR: 18 (2017 12:30) (18 - 18)  SpO2: 95% (2017 12:30) (94% - 95%)  CAPILLARY BLOOD GLUCOSE           @ :  -   @ 07:00  --------------------------------------------------------  IN: 1090 mL / OUT: 0 mL / NET: 1090 mL     @ 07: @ 15:05  --------------------------------------------------------  IN: 380 mL / OUT: 0 mL / NET: 380 mL        Physical Exam:    Daily     Daily Weight in k (2017 07:30)  General:  Well appearing, NAD, not cachetic  HEENT:  Nonicteric, PERRLA  CV:  RRR, S1S2   Lungs:  CTA B/L scattered wheezing and ronchi B   Abdomen:  Soft, non-tender, no distended, positive BS  Extremities:  2+ pulses, no c/c, no edema  Neuro:  AAOx3, non-focal, grossly intact      LABS:                        12.2   20.44 )-----------( 234      ( 2017 07:07 )             35.8     07-20    137  |  100  |  27<H>  ----------------------------<  134<H>  5.0   |  23  |  1.03    Ca    8.9      2017 07:38  Phos  3.0     07-20    TPro  6.6  /  Alb  3.1<L>  /  TBili  0.7  /  DBili  x   /  AST  15  /  ALT  17  /  AlkPhos  62  07-20                RADIOLOGY & ADDITIONAL TESTS:    ---------------------------------------------------------------------------  I personally reviewed: [  ]EKG   [  ]CXR    [  ] CT    [  ]Other  ---------------------------------------------------------------------------

## 2017-07-21 NOTE — PROGRESS NOTE ADULT - PROBLEM SELECTOR PLAN 1
given risk factors  as well as given elevated WBC ( likely sec to steroids ) , CTA done r/o PNA   r/o PE :both not seen on CT     cont prednsione at increased dose for copd exacerbaion . levaquin   discussed with  :L can dc home  on steroid taper

## 2017-07-21 NOTE — PROGRESS NOTE ADULT - ASSESSMENT
sob/chest tightness  likely due to copd exacerbation /PNA  fu with ct and pulm  agree with anbx, nebs, steroids  pt is ruled out for AMI  check echo  ischemic work up once stable , can be done as outpt  HTN  stable  cont current meds  copd  plan as above
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 on abx and prednsione for one week old COPD exacerbation / cough now a /w CP/SOB on day
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 on abx and prednsione for one week old COPD exacerbation / cough now a /w CP/SOB on day
ASSESSMENT    h/o COPD/emphysema with bronchospasm, mucous plugging, cough dyspnea +/- pneumonia  h/o lung cancer s/p lingular segmentectomy - CT scan at [facility] in May with an abnormality treated with levaquin - follow-up CT had been scheduled for August - need to review old films to insure that abnormality has resolved  no evidence of PE on CTA    PLAN/RECOMMENDATIONS    Stable oxygenation on room air  levaquin - 10 day course - can switch to oral given equal bioavailability  prednisone 40 mg daily - taper as written  advair/spiriva - albuterol nebs 2 times daily  mucinex/acapella device  claritin/flonase  pepcid  DVT prophylaxis  cardiology follow-up noted - no evidence of ACS/CHF   nebivolol 5 milliGRAM(s) Oral daily   amLODIPine   Tablet 5 milliGRAM(s) Oral daily    d/w family at bedside    No objection to discharge home. Follow-up with Dr. Crow Burton as planned      Crow Varghese MD, [facility] [place] - [phone]  Pulmonary Medicine
ASSESSMENT    h/o COPD/emphysema with bronchospasm, mucous plugging, cough dyspnea +/- pneumonia  h/o lung cancer s/p lingular segmentectomy - CT scan at [place] in May with an abnormality treated with levaquin - follow-up CT had been scheduled for August - need to review old films to insure that abnormality has resolved  no evidence of PE on CTA    PLAN/RECOMMENDATIONS    Supplement oxygen to keep saturation greater than 92% - taper off as tolerated  levaquin - 7 day course - can switch to oral given equal bioavailability  prednisone 50 mg daily  discontinue symbicort/spiriva - albuterol/atrovent/pulmicort nebs  mucinex  claritin/flonase  pepcid  DVT prophylaxis  cardiology follow-up noted - no evidence of ACS/CHF   nebivolol 5 milliGRAM(s) Oral daily   amLODIPine   Tablet 5 milliGRAM(s) Oral daily    d/w family at bedside    Will follow with you.    Crow Varghese MD, [facility] - [phone]  Pulmonary Medicine
sob/chest tightness  likely due to copd exacerbation /PNA  clinically much improved  pt is ruled out for AMI  echo is unremarkable   ischemic work up once stable , can be done as outpt  HTN  stable  cont current meds  copd  plan as above

## 2017-07-24 LAB
CULTURE RESULTS: SIGNIFICANT CHANGE UP
CULTURE RESULTS: SIGNIFICANT CHANGE UP
SPECIMEN SOURCE: SIGNIFICANT CHANGE UP
SPECIMEN SOURCE: SIGNIFICANT CHANGE UP

## 2018-12-10 NOTE — ED PROVIDER NOTE - OBJECTIVE STATEMENT
normal... 72 year old male w COPD, Lung Cancer s/p resection in 2010 presents w dyspnea, hypotension, chest tightness which began since last night. He was sent to the ED by Dr Rivas (Pulmonary). He has a COPD exacerbation last week for which he was given Amoxicillin and Prednisone. He denies cough, or fever. Does admit to worsening dyspnea since last night. He has also been experiencing palpitations since last night.

## 2019-10-23 PROBLEM — J43.9 EMPHYSEMA, UNSPECIFIED: Chronic | Status: ACTIVE | Noted: 2017-07-19

## 2019-10-23 PROBLEM — C61 MALIGNANT NEOPLASM OF PROSTATE: Chronic | Status: ACTIVE | Noted: 2017-07-19

## 2019-10-23 PROBLEM — C34.90 MALIGNANT NEOPLASM OF UNSPECIFIED PART OF UNSPECIFIED BRONCHUS OR LUNG: Chronic | Status: ACTIVE | Noted: 2017-07-19

## 2019-10-23 PROBLEM — J44.9 CHRONIC OBSTRUCTIVE PULMONARY DISEASE, UNSPECIFIED: Chronic | Status: ACTIVE | Noted: 2017-07-19

## 2019-10-24 PROBLEM — Z00.00 ENCOUNTER FOR PREVENTIVE HEALTH EXAMINATION: Status: ACTIVE | Noted: 2019-10-24

## 2019-11-14 ENCOUNTER — APPOINTMENT (OUTPATIENT)
Dept: SURGERY | Facility: CLINIC | Age: 74
End: 2019-11-14
Payer: MEDICARE

## 2019-11-14 VITALS
BODY MASS INDEX: 22.9 KG/M2 | HEART RATE: 90 BPM | SYSTOLIC BLOOD PRESSURE: 108 MMHG | WEIGHT: 160 LBS | DIASTOLIC BLOOD PRESSURE: 63 MMHG | HEIGHT: 70 IN

## 2019-11-14 DIAGNOSIS — Z86.79 PERSONAL HISTORY OF OTHER DISEASES OF THE CIRCULATORY SYSTEM: ICD-10-CM

## 2019-11-14 DIAGNOSIS — C44.90 UNSPECIFIED MALIGNANT NEOPLASM OF SKIN, UNSPECIFIED: ICD-10-CM

## 2019-11-14 DIAGNOSIS — Z80.3 FAMILY HISTORY OF MALIGNANT NEOPLASM OF BREAST: ICD-10-CM

## 2019-11-14 DIAGNOSIS — Z87.09 PERSONAL HISTORY OF OTHER DISEASES OF THE RESPIRATORY SYSTEM: ICD-10-CM

## 2019-11-14 DIAGNOSIS — Z78.9 OTHER SPECIFIED HEALTH STATUS: ICD-10-CM

## 2019-11-14 PROCEDURE — 99202 OFFICE O/P NEW SF 15 MIN: CPT

## 2019-11-14 RX ORDER — ALBUTEROL SULFATE 90 UG/1
108 (90 BASE) AEROSOL, METERED RESPIRATORY (INHALATION)
Refills: 0 | Status: ACTIVE | COMMUNITY

## 2019-11-14 RX ORDER — DILTIAZEM HYDROCHLORIDE 60 MG/1
TABLET, COATED ORAL
Refills: 0 | Status: ACTIVE | COMMUNITY

## 2019-11-14 RX ORDER — FLUTICASONE PROPIONATE AND SALMETEROL 50; 500 UG/1; UG/1
500-50 POWDER RESPIRATORY (INHALATION)
Refills: 0 | Status: ACTIVE | COMMUNITY

## 2019-11-14 RX ORDER — FAMOTIDINE 40 MG/1
40 TABLET, FILM COATED ORAL
Refills: 0 | Status: ACTIVE | COMMUNITY

## 2019-11-14 RX ORDER — TAMSULOSIN HYDROCHLORIDE 0.4 MG/1
0.4 CAPSULE ORAL
Refills: 0 | Status: ACTIVE | COMMUNITY

## 2019-11-14 RX ORDER — ALPRAZOLAM 0.5 MG/1
0.5 TABLET ORAL
Refills: 0 | Status: ACTIVE | COMMUNITY

## 2019-11-14 RX ORDER — TIOTROPIUM BROMIDE 18 UG/1
18 CAPSULE ORAL; RESPIRATORY (INHALATION)
Refills: 0 | Status: ACTIVE | COMMUNITY

## 2019-11-15 PROBLEM — Z87.09 HISTORY OF CHRONIC OBSTRUCTIVE LUNG DISEASE: Status: RESOLVED | Noted: 2019-11-15 | Resolved: 2019-11-15

## 2019-11-15 NOTE — ASSESSMENT
[FreeTextEntry1] : discussed that these lesions are best evaluated and treated by dermatologist who will biopsy lesions as needed. names given. to return as needed

## 2019-11-15 NOTE — CONSULT LETTER
[Dear  ___] : Dear  [unfilled], [Please see my note below.] : Please see my note below. [Consult Letter:] : I had the pleasure of evaluating your patient, [unfilled]. [Consult Closing:] : Thank you very much for allowing me to participate in the care of this patient.  If you have any questions, please do not hesitate to contact me. [FreeTextEntry3] : Xavier Amaral MD, FACS\par System Director, Endocrine Surgery\par [facility]\par  [FreeTextEntry2] : Dr. Babar Salinas [Sincerely,] : Sincerely,

## 2019-11-15 NOTE — PHYSICAL EXAM
[de-identified] : no palpable thyroid nodules [Midline] : located in midline position [Normal] : cranial nerves 2-12 intact [FreeTextEntry2] : left temple scar, well healed with no evidence of recurrent disease. multiple facial keratoses.

## 2019-11-15 NOTE — HISTORY OF PRESENT ILLNESS
[de-identified] : Pt with history of skin cancer face  treated with Mohs procedure 2017.   now c/o lesions bilateral temples.  denies pain, bleeding, history of trauma.

## 2019-11-15 NOTE — REASON FOR VISIT
[Initial Consultation] : an initial consultation for [Spouse] : spouse [FreeTextEntry2] : Skin cancer

## 2019-12-04 NOTE — ED ADULT NURSE NOTE - CAS DISCH CONDITION
Medical Necessity Information: It is in your best interest to select a reason for this procedure from the list below. All of these items fulfill various CMS LCD requirements except the new and changing color options. Render Post-Care Instructions In Note?: no Detail Level: Simple Post-Care Instructions: I reviewed with the patient in detail post-care instructions. Patient is to wear sunprotection, and avoid picking at any of the treated lesions. Pt may apply Vaseline to crusted or scabbing areas. Medical Necessity Clause: This procedure was medically necessary because the lesions that were treated were: Consent: The patient's consent was obtained including but not limited to risks of crusting, scabbing, blistering, scarring, darker or lighter pigmentary change, recurrence, incomplete removal and infection. Stable

## 2021-07-17 ENCOUNTER — NON-APPOINTMENT (OUTPATIENT)
Age: 76
End: 2021-07-17

## 2021-07-27 ENCOUNTER — APPOINTMENT (OUTPATIENT)
Dept: INFECTIOUS DISEASE | Facility: CLINIC | Age: 76
End: 2021-07-27
Payer: MEDICARE

## 2021-07-27 ENCOUNTER — LABORATORY RESULT (OUTPATIENT)
Age: 76
End: 2021-07-27

## 2021-07-27 VITALS
DIASTOLIC BLOOD PRESSURE: 80 MMHG | HEIGHT: 70 IN | TEMPERATURE: 98.3 F | BODY MASS INDEX: 19.47 KG/M2 | OXYGEN SATURATION: 94 % | HEART RATE: 89 BPM | WEIGHT: 136 LBS | SYSTOLIC BLOOD PRESSURE: 157 MMHG

## 2021-07-27 DIAGNOSIS — I10 ESSENTIAL (PRIMARY) HYPERTENSION: ICD-10-CM

## 2021-07-27 DIAGNOSIS — N20.0 CALCULUS OF KIDNEY: ICD-10-CM

## 2021-07-27 DIAGNOSIS — Z85.828 PERSONAL HISTORY OF OTHER MALIGNANT NEOPLASM OF SKIN: ICD-10-CM

## 2021-07-27 DIAGNOSIS — B00.52 HERPESVIRAL KERATITIS: ICD-10-CM

## 2021-07-27 DIAGNOSIS — Z87.898 PERSONAL HISTORY OF OTHER SPECIFIED CONDITIONS: ICD-10-CM

## 2021-07-27 DIAGNOSIS — Z85.46 PERSONAL HISTORY OF MALIGNANT NEOPLASM OF PROSTATE: ICD-10-CM

## 2021-07-27 DIAGNOSIS — K21.9 GASTRO-ESOPHAGEAL REFLUX DISEASE W/OUT ESOPHAGITIS: ICD-10-CM

## 2021-07-27 DIAGNOSIS — Z87.891 PERSONAL HISTORY OF NICOTINE DEPENDENCE: ICD-10-CM

## 2021-07-27 DIAGNOSIS — Z85.118 PERSONAL HISTORY OF OTHER MALIGNANT NEOPLASM OF BRONCHUS AND LUNG: ICD-10-CM

## 2021-07-27 DIAGNOSIS — J30.9 ALLERGIC RHINITIS, UNSPECIFIED: ICD-10-CM

## 2021-07-27 DIAGNOSIS — Z82.0 FAMILY HISTORY OF EPILEPSY AND OTHER DISEASES OF THE NERVOUS SYSTEM: ICD-10-CM

## 2021-07-27 DIAGNOSIS — Z82.49 FAMILY HISTORY OF ISCHEMIC HEART DISEASE AND OTHER DISEASES OF THE CIRCULATORY SYSTEM: ICD-10-CM

## 2021-07-27 DIAGNOSIS — Z80.1 FAMILY HISTORY OF MALIGNANT NEOPLASM OF TRACHEA, BRONCHUS AND LUNG: ICD-10-CM

## 2021-07-27 DIAGNOSIS — M51.26 OTHER INTERVERTEBRAL DISC DISPLACEMENT, LUMBAR REGION: ICD-10-CM

## 2021-07-27 DIAGNOSIS — Z86.79 PERSONAL HISTORY OF OTHER DISEASES OF THE CIRCULATORY SYSTEM: ICD-10-CM

## 2021-07-27 LAB
ALBUMIN SERPL ELPH-MCNC: 4.1 G/DL
ALP BLD-CCNC: 71 U/L
ALT SERPL-CCNC: 13 U/L
ANION GAP SERPL CALC-SCNC: 10 MMOL/L
AST SERPL-CCNC: 12 U/L
BASOPHILS # BLD AUTO: 0.05 K/UL
BASOPHILS NFR BLD AUTO: 0.4 %
BILIRUB SERPL-MCNC: 0.4 MG/DL
BUN SERPL-MCNC: 18 MG/DL
CALCIUM SERPL-MCNC: 10 MG/DL
CHLORIDE SERPL-SCNC: 96 MMOL/L
CO2 SERPL-SCNC: 27 MMOL/L
CREAT SERPL-MCNC: 0.94 MG/DL
EOSINOPHIL # BLD AUTO: 0.09 K/UL
EOSINOPHIL NFR BLD AUTO: 0.7 %
GLUCOSE SERPL-MCNC: 102 MG/DL
HCT VFR BLD CALC: 37 %
HGB BLD-MCNC: 11.5 G/DL
HIV1+2 AB SPEC QL IA.RAPID: NONREACTIVE
IMM GRANULOCYTES NFR BLD AUTO: 0.5 %
LYMPHOCYTES # BLD AUTO: 0.81 K/UL
LYMPHOCYTES NFR BLD AUTO: 6.6 %
MAN DIFF?: NORMAL
MCHC RBC-ENTMCNC: 27.3 PG
MCHC RBC-ENTMCNC: 31.1 GM/DL
MCV RBC AUTO: 87.7 FL
MONOCYTES # BLD AUTO: 0.49 K/UL
MONOCYTES NFR BLD AUTO: 4 %
NEUTROPHILS # BLD AUTO: 10.85 K/UL
NEUTROPHILS NFR BLD AUTO: 87.8 %
PLATELET # BLD AUTO: 399 K/UL
POTASSIUM SERPL-SCNC: 5.1 MMOL/L
PROT SERPL-MCNC: 7.6 G/DL
RBC # BLD: 4.22 M/UL
RBC # FLD: 15 %
SODIUM SERPL-SCNC: 134 MMOL/L
WBC # FLD AUTO: 12.35 K/UL

## 2021-07-27 PROCEDURE — 99203 OFFICE O/P NEW LOW 30 MIN: CPT

## 2021-07-27 RX ORDER — PREDNISONE 2.5 MG/1
2.5 TABLET ORAL
Refills: 0 | Status: ACTIVE | COMMUNITY

## 2021-07-29 LAB
A FUMIGATUS IGE QN: <0.1 KUA/L
DEPRECATED A FUMIGATUS IGE RAST QL: 0
DEPRECATED KAPPA LC FREE/LAMBDA SER: 1.9 RATIO
IGA SER QL IEP: 291 MG/DL
IGG SER QL IEP: 1718 MG/DL
IGG SUBSET TOTAL IGG: 1721 MG/DL
IGG1 SER-MCNC: 1128 MG/DL
IGG2 SER-MCNC: 341 MG/DL
IGG3 SER-MCNC: 102 MG/DL
IGG4 SER-MCNC: 31 MG/DL
IGM SER QL IEP: 52 MG/DL
KAPPA LC CSF-MCNC: 2.33 MG/DL
KAPPA LC SERPL-MCNC: 4.43 MG/DL
TOTAL IGE SMQN RAST: 72 KU/L

## 2021-07-30 PROBLEM — B00.52: Status: RESOLVED | Noted: 2021-07-27 | Resolved: 2021-07-30

## 2021-07-30 PROBLEM — Z85.828 HISTORY OF SQUAMOUS CELL CARCINOMA OF SKIN: Status: RESOLVED | Noted: 2021-07-27 | Resolved: 2021-07-30

## 2021-07-30 PROBLEM — Z85.46 HISTORY OF MALIGNANT NEOPLASM OF PROSTATE: Status: RESOLVED | Noted: 2019-11-14 | Resolved: 2021-07-30

## 2021-07-30 PROBLEM — Z85.118 HISTORY OF MALIGNANT NEOPLASM OF LUNG: Status: RESOLVED | Noted: 2019-11-14 | Resolved: 2021-07-30

## 2021-07-30 PROBLEM — M51.26 LUMBAR HERNIATED DISC: Status: RESOLVED | Noted: 2021-07-27 | Resolved: 2021-07-30

## 2021-07-30 LAB — BACTERIA SPT CULT: ABNORMAL

## 2021-08-08 LAB
ANNOTATION COMMENT IMP: NORMAL
CFTR MUT TESTED BLD/T: NEGATIVE
ELECTRONIC SIGNATURE: NORMAL
SERPINA1 GENE MUT TESTED BLD/T: NORMAL

## 2021-08-09 ENCOUNTER — NON-APPOINTMENT (OUTPATIENT)
Age: 76
End: 2021-08-09

## 2021-08-26 LAB — FUNGUS SPT CULT: ABNORMAL

## 2021-09-16 LAB — ACID FAST STN SPT: NORMAL

## 2021-11-08 ENCOUNTER — LABORATORY RESULT (OUTPATIENT)
Age: 76
End: 2021-11-08

## 2021-11-08 ENCOUNTER — TRANSCRIPTION ENCOUNTER (OUTPATIENT)
Age: 76
End: 2021-11-08

## 2021-11-08 ENCOUNTER — APPOINTMENT (OUTPATIENT)
Dept: INFECTIOUS DISEASE | Facility: CLINIC | Age: 76
End: 2021-11-08
Payer: MEDICARE

## 2021-11-08 VITALS
WEIGHT: 144 LBS | BODY MASS INDEX: 20.62 KG/M2 | TEMPERATURE: 97.6 F | DIASTOLIC BLOOD PRESSURE: 72 MMHG | HEIGHT: 70 IN | SYSTOLIC BLOOD PRESSURE: 136 MMHG | HEART RATE: 69 BPM | OXYGEN SATURATION: 96 %

## 2021-11-08 DIAGNOSIS — R76.8 OTHER SPECIFIED ABNORMAL IMMUNOLOGICAL FINDINGS IN SERUM: ICD-10-CM

## 2021-11-08 DIAGNOSIS — A31.9 MYCOBACTERIAL INFECTION, UNSPECIFIED: ICD-10-CM

## 2021-11-08 DIAGNOSIS — J43.2 CENTRILOBULAR EMPHYSEMA: ICD-10-CM

## 2021-11-08 DIAGNOSIS — H26.9 UNSPECIFIED CATARACT: ICD-10-CM

## 2021-11-08 PROCEDURE — 99214 OFFICE O/P EST MOD 30 MIN: CPT

## 2021-11-09 LAB
DEPRECATED KAPPA LC FREE/LAMBDA SER: 1.85 RATIO
IGA SER QL IEP: 270 MG/DL
IGG SER QL IEP: 1317 MG/DL
IGM SER QL IEP: 45 MG/DL
KAPPA LC CSF-MCNC: 1.83 MG/DL
KAPPA LC SERPL-MCNC: 3.38 MG/DL

## 2021-11-11 ENCOUNTER — OUTPATIENT (OUTPATIENT)
Dept: OUTPATIENT SERVICES | Facility: HOSPITAL | Age: 76
LOS: 1 days | Discharge: HOME | End: 2021-11-11

## 2021-11-11 ENCOUNTER — NON-APPOINTMENT (OUTPATIENT)
Age: 76
End: 2021-11-11

## 2021-11-11 DIAGNOSIS — D80.8 OTHER IMMUNODEFICIENCIES WITH PREDOMINANTLY ANTIBODY DEFECTS: ICD-10-CM

## 2021-11-11 DIAGNOSIS — Z90.2 ACQUIRED ABSENCE OF LUNG [PART OF]: Chronic | ICD-10-CM

## 2021-11-12 ENCOUNTER — APPOINTMENT (OUTPATIENT)
Dept: HEMATOLOGY ONCOLOGY | Facility: CLINIC | Age: 76
End: 2021-11-12

## 2021-11-12 ENCOUNTER — APPOINTMENT (OUTPATIENT)
Dept: HEMATOLOGY ONCOLOGY | Facility: CLINIC | Age: 76
End: 2021-11-12
Payer: MEDICARE

## 2021-11-12 ENCOUNTER — RESULT REVIEW (OUTPATIENT)
Age: 76
End: 2021-11-12

## 2021-11-12 ENCOUNTER — NON-APPOINTMENT (OUTPATIENT)
Age: 76
End: 2021-11-12

## 2021-11-12 VITALS
HEART RATE: 72 BPM | WEIGHT: 137.35 LBS | BODY MASS INDEX: 19.66 KG/M2 | OXYGEN SATURATION: 97 % | TEMPERATURE: 97.6 F | HEIGHT: 70 IN | SYSTOLIC BLOOD PRESSURE: 147 MMHG | DIASTOLIC BLOOD PRESSURE: 77 MMHG | RESPIRATION RATE: 18 BRPM

## 2021-11-12 LAB
BASOPHILS # BLD AUTO: 0.08 K/UL — SIGNIFICANT CHANGE UP (ref 0–0.2)
BASOPHILS NFR BLD AUTO: 0.8 % — SIGNIFICANT CHANGE UP (ref 0–2)
EOSINOPHIL # BLD AUTO: 0.2 K/UL — SIGNIFICANT CHANGE UP (ref 0–0.5)
EOSINOPHIL NFR BLD AUTO: 2 % — SIGNIFICANT CHANGE UP (ref 0–6)
HCT VFR BLD CALC: 37.4 % — SIGNIFICANT CHANGE UP (ref 34.5–45)
HGB BLD-MCNC: 12.1 G/DL — SIGNIFICANT CHANGE UP (ref 11.5–15.5)
IMM GRANULOCYTES NFR BLD AUTO: 0.6 % — SIGNIFICANT CHANGE UP (ref 0–1.5)
LYMPHOCYTES # BLD AUTO: 0.87 K/UL — LOW (ref 1–3.3)
LYMPHOCYTES # BLD AUTO: 8.9 % — LOW (ref 13–44)
MCHC RBC-ENTMCNC: 28.3 PG — SIGNIFICANT CHANGE UP (ref 27–34)
MCHC RBC-ENTMCNC: 32.4 G/DL — SIGNIFICANT CHANGE UP (ref 32–36)
MCV RBC AUTO: 87.6 FL — SIGNIFICANT CHANGE UP (ref 80–100)
MONOCYTES # BLD AUTO: 0.48 K/UL — SIGNIFICANT CHANGE UP (ref 0–0.9)
MONOCYTES NFR BLD AUTO: 4.9 % — SIGNIFICANT CHANGE UP (ref 2–14)
NEUTROPHILS # BLD AUTO: 8.08 K/UL — HIGH (ref 1.8–7.4)
NEUTROPHILS NFR BLD AUTO: 82.8 % — HIGH (ref 43–77)
NRBC # BLD: 0 /100 WBCS — SIGNIFICANT CHANGE UP (ref 0–0)
PLATELET # BLD AUTO: 182 K/UL — SIGNIFICANT CHANGE UP (ref 150–400)
RBC # BLD: 4.27 M/UL — SIGNIFICANT CHANGE UP (ref 3.8–5.2)
RBC # FLD: 15.6 % — HIGH (ref 10.3–14.5)
WBC # BLD: 9.77 K/UL — SIGNIFICANT CHANGE UP (ref 3.8–10.5)
WBC # FLD AUTO: 9.77 K/UL — SIGNIFICANT CHANGE UP (ref 3.8–10.5)

## 2021-11-12 PROCEDURE — 99204 OFFICE O/P NEW MOD 45 MIN: CPT

## 2021-11-13 LAB
ALBUMIN MFR SERPL ELPH: 52.7 %
ALBUMIN SERPL-MCNC: 3.5 G/DL
ALBUMIN/GLOB SERPL: 1.1 RATIO
ALPHA1 GLOB MFR SERPL ELPH: 5.9 %
ALPHA1 GLOB SERPL ELPH-MCNC: 0.4 G/DL
ALPHA2 GLOB MFR SERPL ELPH: 12.9 %
ALPHA2 GLOB SERPL ELPH-MCNC: 0.9 G/DL
B-GLOBULIN MFR SERPL ELPH: 11.4 %
B-GLOBULIN SERPL ELPH-MCNC: 0.8 G/DL
GAMMA GLOB FLD ELPH-MCNC: 1.1 G/DL
GAMMA GLOB MFR SERPL ELPH: 17.1 %
INTERPRETATION SERPL IEP-IMP: NORMAL
PROT SERPL-MCNC: 6.7 G/DL
PROT SERPL-MCNC: 6.7 G/DL

## 2021-11-13 NOTE — REVIEW OF SYSTEMS
[Negative] : Cardiovascular [FreeTextEntry6] : SOB much better. Sometimes off O2 except when walking with mask on.

## 2021-11-14 PROBLEM — J43.2 CENTRILOBULAR EMPHYSEMA: Status: ACTIVE | Noted: 2021-07-30

## 2021-11-14 PROBLEM — H26.9 CATARACTS, BILATERAL: Status: ACTIVE | Noted: 2021-07-27

## 2021-11-20 ENCOUNTER — OUTPATIENT (OUTPATIENT)
Dept: OUTPATIENT SERVICES | Facility: HOSPITAL | Age: 76
LOS: 1 days | End: 2021-11-20
Payer: MEDICARE

## 2021-11-20 ENCOUNTER — APPOINTMENT (OUTPATIENT)
Dept: RADIOLOGY | Facility: IMAGING CENTER | Age: 76
End: 2021-11-20
Payer: MEDICARE

## 2021-11-20 DIAGNOSIS — R76.8 OTHER SPECIFIED ABNORMAL IMMUNOLOGICAL FINDINGS IN SERUM: ICD-10-CM

## 2021-11-20 DIAGNOSIS — Z90.2 ACQUIRED ABSENCE OF LUNG [PART OF]: Chronic | ICD-10-CM

## 2021-11-20 PROCEDURE — 77075 RADEX OSSEOUS SURVEY COMPL: CPT

## 2021-11-20 PROCEDURE — 77075 RADEX OSSEOUS SURVEY COMPL: CPT | Mod: 26

## 2021-11-22 ENCOUNTER — NON-APPOINTMENT (OUTPATIENT)
Age: 76
End: 2021-11-22

## 2021-11-22 LAB
ALBUMIN MFR SERPL ELPH: 52 %
ALBUMIN SERPL ELPH-MCNC: 4.4 G/DL
ALBUMIN SERPL-MCNC: 3.7 G/DL
ALBUMIN/GLOB SERPL: 1.1 RATIO
ALP BLD-CCNC: 75 U/L
ALPHA1 GLOB MFR SERPL ELPH: 6.5 %
ALPHA1 GLOB SERPL ELPH-MCNC: 0.5 G/DL
ALPHA2 GLOB MFR SERPL ELPH: 13.6 %
ALPHA2 GLOB SERPL ELPH-MCNC: 1 G/DL
ALT SERPL-CCNC: 11 U/L
ANION GAP SERPL CALC-SCNC: 12 MMOL/L
AST SERPL-CCNC: 13 U/L
B-GLOBULIN MFR SERPL ELPH: 11.3 %
B-GLOBULIN SERPL ELPH-MCNC: 0.8 G/DL
B2 MICROGLOB SERPL-MCNC: 2.4 MG/L
BILIRUB SERPL-MCNC: 0.5 MG/DL
BUN SERPL-MCNC: 18 MG/DL
CALCIUM SERPL-MCNC: 9.9 MG/DL
CHLORIDE SERPL-SCNC: 99 MMOL/L
CO2 SERPL-SCNC: 28 MMOL/L
CREAT SERPL-MCNC: 0.92 MG/DL
DEPRECATED KAPPA LC FREE/LAMBDA SER: 2.08 RATIO
DEPRECATED KAPPA LC FREE/LAMBDA SER: 2.08 RATIO
GAMMA GLOB FLD ELPH-MCNC: 1.2 G/DL
GAMMA GLOB MFR SERPL ELPH: 16.6 %
GLUCOSE SERPL-MCNC: 101 MG/DL
IGA SER QL IEP: 265 MG/DL
IGG SER QL IEP: 1331 MG/DL
IGM SER QL IEP: 44 MG/DL
INTERPRETATION SERPL IEP-IMP: NORMAL
KAPPA LC CSF-MCNC: 1.58 MG/DL
KAPPA LC CSF-MCNC: 1.58 MG/DL
KAPPA LC SERPL-MCNC: 3.29 MG/DL
KAPPA LC SERPL-MCNC: 3.29 MG/DL
M PROTEIN MFR SERPL ELPH: NORMAL
M PROTEIN SPEC IFE-MCNC: NORMAL
MONOCLON BAND OBS SERPL: NORMAL
POTASSIUM SERPL-SCNC: 4.9 MMOL/L
PROT SERPL-MCNC: 7.1 G/DL
SODIUM SERPL-SCNC: 139 MMOL/L

## 2021-11-26 ENCOUNTER — NON-APPOINTMENT (OUTPATIENT)
Age: 76
End: 2021-11-26

## 2021-11-30 ENCOUNTER — LABORATORY RESULT (OUTPATIENT)
Age: 76
End: 2021-11-30

## 2021-12-03 ENCOUNTER — NON-APPOINTMENT (OUTPATIENT)
Age: 76
End: 2021-12-03

## 2021-12-03 LAB — BACTERIA SPT CULT: ABNORMAL

## 2021-12-20 NOTE — H&P ADULT - HEMATOLOGY/LYMPHATICS
Lasix      Last Written Prescription Date:  06/23/21  Last Fill Quantity: 90,   # refills: 0  Last Office Visit: 11/05/21  Future Office visit:            negative

## 2021-12-30 LAB — FUNGUS SPT CULT: ABNORMAL

## 2022-01-19 LAB — ACID FAST STN SPT: ABNORMAL

## 2022-11-21 ENCOUNTER — APPOINTMENT (OUTPATIENT)
Dept: HEMATOLOGY ONCOLOGY | Facility: CLINIC | Age: 77
End: 2022-11-21

## 2024-09-09 NOTE — PATIENT PROFILE ADULT. - PRO SERVICES AT DISCH
Pt wife izabel notified.   
Pt wife lm stating he is always cold and she is wondering if the blood work showed anything that might be off?  
Pt wife notified. Cindy is wondering if there is any other blood work you would order for him she is wondering about thyroid, he was so cold this morning he put on 3 pairs of pants.  
none

## 2024-09-15 NOTE — ED ADULT NURSE NOTE - NS PRO PASSIVE SMOKE EXP
Problem: Adult Inpatient Plan of Care  Goal: Plan of Care Review  Outcome: Progressing  Goal: Patient-Specific Goal (Individualized)  Outcome: Progressing  Goal: Absence of Hospital-Acquired Illness or Injury  Outcome: Progressing  Goal: Optimal Comfort and Wellbeing  Outcome: Progressing  Goal: Readiness for Transition of Care  Outcome: Progressing     Problem: Wound  Goal: Optimal Coping  Outcome: Progressing  Goal: Optimal Functional Ability  Outcome: Progressing  Goal: Absence of Infection Signs and Symptoms  Outcome: Progressing  Goal: Improved Oral Intake  Outcome: Progressing  Goal: Optimal Pain Control and Function  Outcome: Progressing  Goal: Skin Health and Integrity  Outcome: Progressing  Goal: Optimal Wound Healing  Outcome: Progressing     Problem: Fall Injury Risk  Goal: Absence of Fall and Fall-Related Injury  Outcome: Progressing     Problem: Skin Injury Risk Increased  Goal: Skin Health and Integrity  Outcome: Progressing     Problem: Infection  Goal: Absence of Infection Signs and Symptoms  Outcome: Progressing     Problem: Acute Kidney Injury/Impairment  Goal: Fluid and Electrolyte Balance  Outcome: Progressing  Goal: Improved Oral Intake  Outcome: Progressing  Goal: Effective Renal Function  Outcome: Progressing      No

## 2025-03-04 NOTE — DISCHARGE NOTE ADULT - WITHDRAWAL SYMPTOMS INCLUDE; NEGATIVE MOOD, URGES TO SMOKE, AND DIFFICULTY CONCENTRATING.
Patient's daughter called and patient is scheduled for james removal and PVR on 3/10 at 9 Am and 3 PM in [place].  
Please review/advise      Please call patient patient appointments  
Pt daughter Elissamanuel calling to speak with clinical regarding james catheter.     Pt was seen in office on 2/28 for TOV. The next day pt was seen in ED for urinary retention and had to have a james catheter reinserted.     Pt daughter is requesting a call back from clinical to discuss timeframe to have james removed. Please review.     Pt CB- [phone] Pat (daughter)   
Statement Selected